# Patient Record
Sex: FEMALE | URBAN - METROPOLITAN AREA
[De-identification: names, ages, dates, MRNs, and addresses within clinical notes are randomized per-mention and may not be internally consistent; named-entity substitution may affect disease eponyms.]

---

## 2017-05-08 ENCOUNTER — INPATIENT (INPATIENT)
Facility: HOSPITAL | Age: 67
LOS: 9 days | Discharge: HOME | End: 2017-05-18
Attending: SURGERY | Admitting: SURGERY
Payer: MEDICARE

## 2017-05-08 VITALS
RESPIRATION RATE: 15 BRPM | SYSTOLIC BLOOD PRESSURE: 129 MMHG | HEART RATE: 99 BPM | DIASTOLIC BLOOD PRESSURE: 94 MMHG | TEMPERATURE: 98 F | OXYGEN SATURATION: 100 %

## 2017-05-08 PROBLEM — Z00.00 ENCOUNTER FOR PREVENTIVE HEALTH EXAMINATION: Status: ACTIVE | Noted: 2017-05-08

## 2017-05-08 LAB
ALBUMIN SERPL ELPH-MCNC: 4 G/DL — SIGNIFICANT CHANGE UP (ref 3.3–5)
ALP SERPL-CCNC: 78 U/L — SIGNIFICANT CHANGE UP (ref 40–120)
ALT FLD-CCNC: 12 U/L — SIGNIFICANT CHANGE UP (ref 4–33)
APTT BLD: 27.7 SEC — SIGNIFICANT CHANGE UP (ref 27.5–37.4)
AST SERPL-CCNC: 14 U/L — SIGNIFICANT CHANGE UP (ref 4–32)
BASE EXCESS BLDV CALC-SCNC: 1.6 MMOL/L — SIGNIFICANT CHANGE UP
BASOPHILS # BLD AUTO: 0.03 K/UL — SIGNIFICANT CHANGE UP (ref 0–0.2)
BASOPHILS NFR BLD AUTO: 0.2 % — SIGNIFICANT CHANGE UP (ref 0–2)
BILIRUB SERPL-MCNC: 0.3 MG/DL — SIGNIFICANT CHANGE UP (ref 0.2–1.2)
BLD GP AB SCN SERPL QL: NEGATIVE — SIGNIFICANT CHANGE UP
BLOOD GAS VENOUS - CREATININE: 0.73 MG/DL — SIGNIFICANT CHANGE UP (ref 0.5–1.3)
BUN SERPL-MCNC: 19 MG/DL — SIGNIFICANT CHANGE UP (ref 7–23)
CALCIUM SERPL-MCNC: 9.7 MG/DL — SIGNIFICANT CHANGE UP (ref 8.4–10.5)
CHLORIDE BLDV-SCNC: 107 MMOL/L — SIGNIFICANT CHANGE UP (ref 96–108)
CHLORIDE SERPL-SCNC: 104 MMOL/L — SIGNIFICANT CHANGE UP (ref 98–107)
CO2 SERPL-SCNC: 24 MMOL/L — SIGNIFICANT CHANGE UP (ref 22–31)
CREAT SERPL-MCNC: 0.79 MG/DL — SIGNIFICANT CHANGE UP (ref 0.5–1.3)
EOSINOPHIL # BLD AUTO: 0 K/UL — SIGNIFICANT CHANGE UP (ref 0–0.5)
EOSINOPHIL NFR BLD AUTO: 0 % — SIGNIFICANT CHANGE UP (ref 0–6)
GAS PNL BLDV: 143 MMOL/L — SIGNIFICANT CHANGE UP (ref 136–146)
GLUCOSE BLDV-MCNC: 116 — HIGH (ref 70–99)
GLUCOSE SERPL-MCNC: 119 MG/DL — HIGH (ref 70–99)
HCO3 BLDV-SCNC: 24 MMOL/L — SIGNIFICANT CHANGE UP (ref 20–27)
HCT VFR BLD CALC: 32.2 % — LOW (ref 34.5–45)
HCT VFR BLDV CALC: 33.9 % — LOW (ref 34.5–45)
HGB BLD-MCNC: 10.8 G/DL — LOW (ref 11.5–15.5)
HGB BLDV-MCNC: 11 G/DL — LOW (ref 11.5–15.5)
IMM GRANULOCYTES NFR BLD AUTO: 0.5 % — SIGNIFICANT CHANGE UP (ref 0–1.5)
INR BLD: 1.02 — SIGNIFICANT CHANGE UP (ref 0.88–1.17)
LACTATE BLDV-MCNC: 1.2 MMOL/L — SIGNIFICANT CHANGE UP (ref 0.5–2)
LYMPHOCYTES # BLD AUTO: 1.77 K/UL — SIGNIFICANT CHANGE UP (ref 1–3.3)
LYMPHOCYTES # BLD AUTO: 11.8 % — LOW (ref 13–44)
MAGNESIUM SERPL-MCNC: 1.7 MG/DL — SIGNIFICANT CHANGE UP (ref 1.6–2.6)
MCHC RBC-ENTMCNC: 30.7 PG — SIGNIFICANT CHANGE UP (ref 27–34)
MCHC RBC-ENTMCNC: 33.5 % — SIGNIFICANT CHANGE UP (ref 32–36)
MCV RBC AUTO: 91.5 FL — SIGNIFICANT CHANGE UP (ref 80–100)
MONOCYTES # BLD AUTO: 1.03 K/UL — HIGH (ref 0–0.9)
MONOCYTES NFR BLD AUTO: 6.9 % — SIGNIFICANT CHANGE UP (ref 2–14)
NEUTROPHILS # BLD AUTO: 12.12 K/UL — HIGH (ref 1.8–7.4)
NEUTROPHILS NFR BLD AUTO: 80.6 % — HIGH (ref 43–77)
OB PNL STL: POSITIVE — SIGNIFICANT CHANGE UP
PCO2 BLDV: 47 MMHG — SIGNIFICANT CHANGE UP (ref 41–51)
PH BLDV: 7.37 PH — SIGNIFICANT CHANGE UP (ref 7.32–7.43)
PLATELET # BLD AUTO: 214 K/UL — SIGNIFICANT CHANGE UP (ref 150–400)
PMV BLD: 10.5 FL — SIGNIFICANT CHANGE UP (ref 7–13)
PO2 BLDV: < 24 MMHG — LOW (ref 35–40)
POTASSIUM BLDV-SCNC: 4.2 MMOL/L — SIGNIFICANT CHANGE UP (ref 3.4–4.5)
POTASSIUM SERPL-MCNC: 4.3 MMOL/L — SIGNIFICANT CHANGE UP (ref 3.5–5.3)
POTASSIUM SERPL-SCNC: 4.3 MMOL/L — SIGNIFICANT CHANGE UP (ref 3.5–5.3)
PROT SERPL-MCNC: 6.9 G/DL — SIGNIFICANT CHANGE UP (ref 6–8.3)
PROTHROM AB SERPL-ACNC: 11.4 SEC — SIGNIFICANT CHANGE UP (ref 9.8–13.1)
RBC # BLD: 3.52 M/UL — LOW (ref 3.8–5.2)
RBC # FLD: 14.6 % — HIGH (ref 10.3–14.5)
RH IG SCN BLD-IMP: NEGATIVE — SIGNIFICANT CHANGE UP
SAO2 % BLDV: 29.3 % — LOW (ref 60–85)
SODIUM SERPL-SCNC: 142 MMOL/L — SIGNIFICANT CHANGE UP (ref 135–145)
WBC # BLD: 15.02 K/UL — HIGH (ref 3.8–10.5)
WBC # FLD AUTO: 15.02 K/UL — HIGH (ref 3.8–10.5)

## 2017-05-08 RX ORDER — ONDANSETRON 8 MG/1
4 TABLET, FILM COATED ORAL ONCE
Qty: 0 | Refills: 0 | Status: COMPLETED | OUTPATIENT
Start: 2017-05-08 | End: 2017-05-08

## 2017-05-08 RX ORDER — SODIUM CHLORIDE 9 MG/ML
1000 INJECTION INTRAMUSCULAR; INTRAVENOUS; SUBCUTANEOUS ONCE
Qty: 0 | Refills: 0 | Status: COMPLETED | OUTPATIENT
Start: 2017-05-08 | End: 2017-05-08

## 2017-05-08 RX ADMIN — SODIUM CHLORIDE 1000 MILLILITER(S): 9 INJECTION INTRAMUSCULAR; INTRAVENOUS; SUBCUTANEOUS at 21:41

## 2017-05-08 NOTE — ED ADULT TRIAGE NOTE - CHIEF COMPLAINT QUOTE
Pt c/o rectal bleeding since last night.  Pt denies any use of blood thinners.  Endorses abdominal pain/N/V.  Pt states was sent in PMD for CT scan. Pt appears pale.  PMHx:  IBS, GERD, diverticulitis, colon resection

## 2017-05-08 NOTE — ED PROVIDER NOTE - MEDICAL DECISION MAKING DETAILS
67 yo F w/ hx of IBS, GERD, diverticulitis, s/p colon resection c/o bright red blood per rectum with RLQ pain since last night. Pt has had about 5 bloody BMs. Pt also endorses weakness, syncopal episode today (no head injury), nausea, non-bloody vomiting. Pt denies use of blood thinners. Pt states was sent in PMD for CT scan.  - Labs, IVF, zofran, Guaiac, CT

## 2017-05-08 NOTE — ED PROVIDER NOTE - OBJECTIVE STATEMENT
65 yo F w/ hx of IBS, GERD, diverticulitis, s/p colon resection c/o bright red blood per rectum with RLQ pain since last night. Pt has had about 5 bloody BMs. Pt also endorses weakness, syncopal episode today (no head injury), nausea, non-bloody vomiting. Pt denies use of blood thinners. Pt states was sent in PMD for CT scan.

## 2017-05-08 NOTE — ED PROVIDER NOTE - ATTENDING CONTRIBUTION TO CARE
Locurto  pt with h/o diverticulitis and partial colectomy  with 1 day of rectal bleeding  assoc with soft stool and RLQ pain     Went to PMD office where she syncopized while siting  Syncope preceeded by her typical panic attack which she felt was due to anxiety  She has had similar episodes in the past   exam  pt in NAD clear lungs  card RRR S1S2  no murmur  abd nondistended  minimal RLQ tenderness    no guarding or rebound Locurto  pt with h/o diverticulitis and partial colectomy  with 1 day of rectal bleeding  assoc with soft stool and RLQ pain     Went to PMD office where she syncopized while siting  Syncope preceeded by her typical panic attack which she felt was due to anxiety  She has had similar episodes in the past   exam  pt in NAD clear lungs  card RRR S1S2  no murmur  abd nondistended  minimal RLQ tenderness    no guarding or rebound  not orthostatic in in ED

## 2017-05-08 NOTE — ED PROVIDER NOTE - PMH
Age Related Osteoporosis    Diverticulitis    GERD (Gastroesophageal Reflux    IBS (Irritable Bowel Syndrome)

## 2017-05-08 NOTE — ED ADULT NURSE NOTE - OBJECTIVE STATEMENT
Alert and oriented x 4. Pt received to spot 6 complaining of LRQ abd pain 6/10 and bloody stool since last night. Pt states she is also nauseous and has syncopal episode , no head trauma. Pt denies chest pain, shortness of breath or dizziness. IV 20g to left AC. Labs drawn. VSS.  at bedside. Will continue to monitor. RN Facilitator.

## 2017-05-09 DIAGNOSIS — K92.2 GASTROINTESTINAL HEMORRHAGE, UNSPECIFIED: ICD-10-CM

## 2017-05-09 LAB
APTT BLD: 25.8 SEC — LOW (ref 27.5–37.4)
BASE EXCESS BLDA CALC-SCNC: -1.6 MMOL/L — SIGNIFICANT CHANGE UP
BLD GP AB SCN SERPL QL: NEGATIVE — SIGNIFICANT CHANGE UP
BUN SERPL-MCNC: 10 MG/DL — SIGNIFICANT CHANGE UP (ref 7–23)
BUN SERPL-MCNC: 16 MG/DL — SIGNIFICANT CHANGE UP (ref 7–23)
CALCIUM SERPL-MCNC: 7.7 MG/DL — LOW (ref 8.4–10.5)
CALCIUM SERPL-MCNC: 8.5 MG/DL — SIGNIFICANT CHANGE UP (ref 8.4–10.5)
CHLORIDE SERPL-SCNC: 106 MMOL/L — SIGNIFICANT CHANGE UP (ref 98–107)
CHLORIDE SERPL-SCNC: 113 MMOL/L — HIGH (ref 98–107)
CO2 SERPL-SCNC: 20 MMOL/L — LOW (ref 22–31)
CO2 SERPL-SCNC: 22 MMOL/L — SIGNIFICANT CHANGE UP (ref 22–31)
CREAT SERPL-MCNC: 0.56 MG/DL — SIGNIFICANT CHANGE UP (ref 0.5–1.3)
CREAT SERPL-MCNC: 0.63 MG/DL — SIGNIFICANT CHANGE UP (ref 0.5–1.3)
GLUCOSE BLDA-MCNC: 152 MG/DL — HIGH (ref 70–99)
GLUCOSE SERPL-MCNC: 115 MG/DL — HIGH (ref 70–99)
GLUCOSE SERPL-MCNC: 152 MG/DL — HIGH (ref 70–99)
HCO3 BLDA-SCNC: 23 MMOL/L — SIGNIFICANT CHANGE UP (ref 22–26)
HCT VFR BLD CALC: 25 % — LOW (ref 34.5–45)
HCT VFR BLD CALC: 25 % — LOW (ref 34.5–45)
HCT VFR BLD CALC: 32.3 % — LOW (ref 34.5–45)
HCT VFR BLD CALC: 33.3 % — LOW (ref 34.5–45)
HCT VFR BLDA CALC: 28.1 % — LOW (ref 34.5–46.5)
HGB BLD-MCNC: 11.3 G/DL — LOW (ref 11.5–15.5)
HGB BLD-MCNC: 11.5 G/DL — SIGNIFICANT CHANGE UP (ref 11.5–15.5)
HGB BLD-MCNC: 8.5 G/DL — LOW (ref 11.5–15.5)
HGB BLD-MCNC: 8.6 G/DL — LOW (ref 11.5–15.5)
HGB BLDA-MCNC: 9.1 G/DL — LOW (ref 11.5–15.5)
INR BLD: 1.04 — SIGNIFICANT CHANGE UP (ref 0.88–1.17)
LACTATE SERPL-SCNC: 2.5 MMOL/L — HIGH (ref 0.5–2)
MAGNESIUM SERPL-MCNC: 1.6 MG/DL — SIGNIFICANT CHANGE UP (ref 1.6–2.6)
MCHC RBC-ENTMCNC: 30.3 PG — SIGNIFICANT CHANGE UP (ref 27–34)
MCHC RBC-ENTMCNC: 30.6 PG — SIGNIFICANT CHANGE UP (ref 27–34)
MCHC RBC-ENTMCNC: 30.9 PG — SIGNIFICANT CHANGE UP (ref 27–34)
MCHC RBC-ENTMCNC: 31.8 PG — SIGNIFICANT CHANGE UP (ref 27–34)
MCHC RBC-ENTMCNC: 33.9 % — SIGNIFICANT CHANGE UP (ref 32–36)
MCHC RBC-ENTMCNC: 34 % — SIGNIFICANT CHANGE UP (ref 32–36)
MCHC RBC-ENTMCNC: 34.4 % — SIGNIFICANT CHANGE UP (ref 32–36)
MCHC RBC-ENTMCNC: 35.6 % — SIGNIFICANT CHANGE UP (ref 32–36)
MCV RBC AUTO: 89 FL — SIGNIFICANT CHANGE UP (ref 80–100)
MCV RBC AUTO: 89.2 FL — SIGNIFICANT CHANGE UP (ref 80–100)
MCV RBC AUTO: 89.3 FL — SIGNIFICANT CHANGE UP (ref 80–100)
MCV RBC AUTO: 90.9 FL — SIGNIFICANT CHANGE UP (ref 80–100)
PCO2 BLDA: 32 MMHG — SIGNIFICANT CHANGE UP (ref 32–48)
PH BLDA: 7.45 PH — SIGNIFICANT CHANGE UP (ref 7.35–7.45)
PHOSPHATE SERPL-MCNC: 2.8 MG/DL — SIGNIFICANT CHANGE UP (ref 2.5–4.5)
PLATELET # BLD AUTO: 139 K/UL — LOW (ref 150–400)
PLATELET # BLD AUTO: 167 K/UL — SIGNIFICANT CHANGE UP (ref 150–400)
PLATELET # BLD AUTO: 174 K/UL — SIGNIFICANT CHANGE UP (ref 150–400)
PLATELET # BLD AUTO: 179 K/UL — SIGNIFICANT CHANGE UP (ref 150–400)
PMV BLD: 10.3 FL — SIGNIFICANT CHANGE UP (ref 7–13)
PMV BLD: 10.3 FL — SIGNIFICANT CHANGE UP (ref 7–13)
PMV BLD: 10.6 FL — SIGNIFICANT CHANGE UP (ref 7–13)
PMV BLD: 11.4 FL — SIGNIFICANT CHANGE UP (ref 7–13)
PO2 BLDA: 350 MMHG — HIGH (ref 83–108)
POTASSIUM BLDA-SCNC: 3.4 MMOL/L — SIGNIFICANT CHANGE UP (ref 3.4–4.5)
POTASSIUM SERPL-MCNC: 3.6 MMOL/L — SIGNIFICANT CHANGE UP (ref 3.5–5.3)
POTASSIUM SERPL-MCNC: 4 MMOL/L — SIGNIFICANT CHANGE UP (ref 3.5–5.3)
POTASSIUM SERPL-SCNC: 3.6 MMOL/L — SIGNIFICANT CHANGE UP (ref 3.5–5.3)
POTASSIUM SERPL-SCNC: 4 MMOL/L — SIGNIFICANT CHANGE UP (ref 3.5–5.3)
PROTHROM AB SERPL-ACNC: 11.7 SEC — SIGNIFICANT CHANGE UP (ref 9.8–13.1)
RBC # BLD: 2.75 M/UL — LOW (ref 3.8–5.2)
RBC # BLD: 2.81 M/UL — LOW (ref 3.8–5.2)
RBC # BLD: 3.62 M/UL — LOW (ref 3.8–5.2)
RBC # BLD: 3.73 M/UL — LOW (ref 3.8–5.2)
RBC # FLD: 14.7 % — HIGH (ref 10.3–14.5)
RBC # FLD: 14.8 % — HIGH (ref 10.3–14.5)
RBC # FLD: 15 % — HIGH (ref 10.3–14.5)
RBC # FLD: 15.2 % — HIGH (ref 10.3–14.5)
RH IG SCN BLD-IMP: NEGATIVE — SIGNIFICANT CHANGE UP
SAO2 % BLDA: 100 % — HIGH (ref 95–99)
SODIUM BLDA-SCNC: 141 MMOL/L — SIGNIFICANT CHANGE UP (ref 136–146)
SODIUM SERPL-SCNC: 141 MMOL/L — SIGNIFICANT CHANGE UP (ref 135–145)
SODIUM SERPL-SCNC: 146 MMOL/L — HIGH (ref 135–145)
WBC # BLD: 10.4 K/UL — SIGNIFICANT CHANGE UP (ref 3.8–10.5)
WBC # BLD: 10.64 K/UL — HIGH (ref 3.8–10.5)
WBC # BLD: 7.91 K/UL — SIGNIFICANT CHANGE UP (ref 3.8–10.5)
WBC # BLD: 9.53 K/UL — SIGNIFICANT CHANGE UP (ref 3.8–10.5)
WBC # FLD AUTO: 10.4 K/UL — SIGNIFICANT CHANGE UP (ref 3.8–10.5)
WBC # FLD AUTO: 10.64 K/UL — HIGH (ref 3.8–10.5)
WBC # FLD AUTO: 7.91 K/UL — SIGNIFICANT CHANGE UP (ref 3.8–10.5)
WBC # FLD AUTO: 9.53 K/UL — SIGNIFICANT CHANGE UP (ref 3.8–10.5)

## 2017-05-09 PROCEDURE — 99223 1ST HOSP IP/OBS HIGH 75: CPT | Mod: 25

## 2017-05-09 PROCEDURE — 74178 CT ABD&PLV WO CNTR FLWD CNTR: CPT | Mod: 26

## 2017-05-09 RX ORDER — SIMVASTATIN 20 MG/1
1 TABLET, FILM COATED ORAL
Qty: 0 | Refills: 0 | COMMUNITY

## 2017-05-09 RX ORDER — MAGNESIUM SULFATE 500 MG/ML
2 VIAL (ML) INJECTION ONCE
Qty: 0 | Refills: 0 | Status: COMPLETED | OUTPATIENT
Start: 2017-05-09 | End: 2017-05-09

## 2017-05-09 RX ORDER — SODIUM CHLORIDE 9 MG/ML
1000 INJECTION, SOLUTION INTRAVENOUS ONCE
Qty: 0 | Refills: 0 | Status: COMPLETED | OUTPATIENT
Start: 2017-05-09 | End: 2017-05-09

## 2017-05-09 RX ORDER — POTASSIUM PHOSPHATE, MONOBASIC POTASSIUM PHOSPHATE, DIBASIC 236; 224 MG/ML; MG/ML
15 INJECTION, SOLUTION INTRAVENOUS ONCE
Qty: 0 | Refills: 0 | Status: COMPLETED | OUTPATIENT
Start: 2017-05-09 | End: 2017-05-09

## 2017-05-09 RX ORDER — ONDANSETRON 8 MG/1
4 TABLET, FILM COATED ORAL ONCE
Qty: 0 | Refills: 0 | Status: COMPLETED | OUTPATIENT
Start: 2017-05-09 | End: 2017-05-09

## 2017-05-09 RX ORDER — ACETAMINOPHEN 500 MG
1000 TABLET ORAL ONCE
Qty: 0 | Refills: 0 | Status: COMPLETED | OUTPATIENT
Start: 2017-05-09 | End: 2017-05-09

## 2017-05-09 RX ORDER — OMEPRAZOLE 10 MG/1
1 CAPSULE, DELAYED RELEASE ORAL
Qty: 0 | Refills: 0 | COMMUNITY

## 2017-05-09 RX ORDER — SODIUM CHLORIDE 9 MG/ML
1000 INJECTION, SOLUTION INTRAVENOUS
Qty: 0 | Refills: 0 | Status: DISCONTINUED | OUTPATIENT
Start: 2017-05-09 | End: 2017-05-12

## 2017-05-09 RX ORDER — PANTOPRAZOLE SODIUM 20 MG/1
40 TABLET, DELAYED RELEASE ORAL DAILY
Qty: 0 | Refills: 0 | Status: DISCONTINUED | OUTPATIENT
Start: 2017-05-09 | End: 2017-05-15

## 2017-05-09 RX ADMIN — Medication 50 GRAM(S): at 13:59

## 2017-05-09 RX ADMIN — SODIUM CHLORIDE 100 MILLILITER(S): 9 INJECTION, SOLUTION INTRAVENOUS at 01:43

## 2017-05-09 RX ADMIN — SODIUM CHLORIDE 2000 MILLILITER(S): 9 INJECTION, SOLUTION INTRAVENOUS at 11:37

## 2017-05-09 RX ADMIN — PANTOPRAZOLE SODIUM 40 MILLIGRAM(S): 20 TABLET, DELAYED RELEASE ORAL at 16:17

## 2017-05-09 RX ADMIN — Medication 400 MILLIGRAM(S): at 11:37

## 2017-05-09 RX ADMIN — SODIUM CHLORIDE 2000 MILLILITER(S): 9 INJECTION, SOLUTION INTRAVENOUS at 18:45

## 2017-05-09 RX ADMIN — SODIUM CHLORIDE 100 MILLILITER(S): 9 INJECTION, SOLUTION INTRAVENOUS at 20:00

## 2017-05-09 RX ADMIN — ONDANSETRON 4 MILLIGRAM(S): 8 TABLET, FILM COATED ORAL at 16:17

## 2017-05-09 RX ADMIN — POTASSIUM PHOSPHATE, MONOBASIC POTASSIUM PHOSPHATE, DIBASIC 62.5 MILLIMOLE(S): 236; 224 INJECTION, SOLUTION INTRAVENOUS at 13:59

## 2017-05-09 RX ADMIN — Medication 1000 MILLIGRAM(S): at 11:52

## 2017-05-09 RX ADMIN — SODIUM CHLORIDE 2000 MILLILITER(S): 9 INJECTION, SOLUTION INTRAVENOUS at 23:20

## 2017-05-09 RX ADMIN — SODIUM CHLORIDE 100 MILLILITER(S): 9 INJECTION, SOLUTION INTRAVENOUS at 13:31

## 2017-05-09 RX ADMIN — ONDANSETRON 4 MILLIGRAM(S): 8 TABLET, FILM COATED ORAL at 07:53

## 2017-05-09 NOTE — H&P ADULT. - HISTORY OF PRESENT ILLNESS
66 F presents with bright red blood per rectum. Patient noticed bright red blood with her bowel movements over the last 1 day. 66 F presents with bright red blood per rectum. Patient noticed bright red blood with her bowel movements over the last 1 day. Patient underwent a laparoscopic assisted sigmoid colectomy for diverticulitis with Dr. Davis in 2009. Supposedly, had one episode of R-sided diverticulitis after that surgery. Her was last colonoscopy was about 3 months ago with her gastroenterologist, Dr. Joseph. However, according to the patient, the colonoscope was unable to transverse to the R side. Patient has had no bloody bowel movements in the ED.

## 2017-05-09 NOTE — H&P ADULT. - ASSESSMENT
66 F with history of sigmoid colectomy for diverticulitis presents with BRBPR. Will admit to [facility] ([facility]).  -NPO  -IVF  -Serial Hcts, transfuse as needed  -Mechanical VTE prophylaxis  -Will need to consult GI for possible colonoscopy  -D/w attending  BERNICE Soto

## 2017-05-10 LAB
APTT BLD: 24.8 SEC — LOW (ref 27.5–37.4)
APTT BLD: 25.5 SEC — LOW (ref 27.5–37.4)
BASE EXCESS BLDV CALC-SCNC: 1.7 MMOL/L — SIGNIFICANT CHANGE UP
BLOOD GAS VENOUS - CREATININE: 0.59 MG/DL — SIGNIFICANT CHANGE UP (ref 0.5–1.3)
BUN SERPL-MCNC: 8 MG/DL — SIGNIFICANT CHANGE UP (ref 7–23)
BUN SERPL-MCNC: 8 MG/DL — SIGNIFICANT CHANGE UP (ref 7–23)
CA-I BLD-SCNC: 1.1 MMOL/L — SIGNIFICANT CHANGE UP (ref 1.03–1.23)
CALCIUM SERPL-MCNC: 8.2 MG/DL — LOW (ref 8.4–10.5)
CALCIUM SERPL-MCNC: 8.4 MG/DL — SIGNIFICANT CHANGE UP (ref 8.4–10.5)
CHLORIDE BLDV-SCNC: 112 MMOL/L — HIGH (ref 96–108)
CHLORIDE SERPL-SCNC: 110 MMOL/L — HIGH (ref 98–107)
CHLORIDE SERPL-SCNC: 113 MMOL/L — HIGH (ref 98–107)
CO2 SERPL-SCNC: 20 MMOL/L — LOW (ref 22–31)
CO2 SERPL-SCNC: 22 MMOL/L — SIGNIFICANT CHANGE UP (ref 22–31)
CREAT SERPL-MCNC: 0.54 MG/DL — SIGNIFICANT CHANGE UP (ref 0.5–1.3)
CREAT SERPL-MCNC: 0.54 MG/DL — SIGNIFICANT CHANGE UP (ref 0.5–1.3)
FIBRINOGEN PPP-MCNC: 326.5 MG/DL — SIGNIFICANT CHANGE UP (ref 310–510)
GAS PNL BLDV: 143 MMOL/L — SIGNIFICANT CHANGE UP (ref 136–146)
GLUCOSE BLDV-MCNC: 85 — SIGNIFICANT CHANGE UP (ref 70–99)
GLUCOSE SERPL-MCNC: 112 MG/DL — HIGH (ref 70–99)
GLUCOSE SERPL-MCNC: 89 MG/DL — SIGNIFICANT CHANGE UP (ref 70–99)
HCO3 BLDV-SCNC: 25 MMOL/L — SIGNIFICANT CHANGE UP (ref 20–27)
HCT VFR BLD CALC: 23.6 % — LOW (ref 34.5–45)
HCT VFR BLD CALC: 28.2 % — LOW (ref 34.5–45)
HCT VFR BLD CALC: 29.8 % — LOW (ref 34.5–45)
HCT VFR BLD CALC: 29.8 % — LOW (ref 34.5–45)
HCT VFR BLD CALC: 29.9 % — LOW (ref 34.5–45)
HCT VFR BLD CALC: 30.6 % — LOW (ref 34.5–45)
HCT VFR BLD CALC: 31 % — LOW (ref 34.5–45)
HCT VFR BLDV CALC: 32.5 % — LOW (ref 34.5–45)
HGB BLD-MCNC: 10.3 G/DL — LOW (ref 11.5–15.5)
HGB BLD-MCNC: 10.3 G/DL — LOW (ref 11.5–15.5)
HGB BLD-MCNC: 10.5 G/DL — LOW (ref 11.5–15.5)
HGB BLD-MCNC: 10.6 G/DL — LOW (ref 11.5–15.5)
HGB BLD-MCNC: 10.9 G/DL — LOW (ref 11.5–15.5)
HGB BLD-MCNC: 8.3 G/DL — LOW (ref 11.5–15.5)
HGB BLD-MCNC: 9.5 G/DL — LOW (ref 11.5–15.5)
HGB BLDV-MCNC: 10.5 G/DL — LOW (ref 11.5–15.5)
INR BLD: 1.01 — SIGNIFICANT CHANGE UP (ref 0.88–1.17)
INR BLD: 1.08 — SIGNIFICANT CHANGE UP (ref 0.88–1.17)
LACTATE BLDV-MCNC: 0.8 MMOL/L — SIGNIFICANT CHANGE UP (ref 0.5–2)
MAGNESIUM SERPL-MCNC: 1.6 MG/DL — SIGNIFICANT CHANGE UP (ref 1.6–2.6)
MAGNESIUM SERPL-MCNC: 1.9 MG/DL — SIGNIFICANT CHANGE UP (ref 1.6–2.6)
MCHC RBC-ENTMCNC: 28 PG — SIGNIFICANT CHANGE UP (ref 27–34)
MCHC RBC-ENTMCNC: 28.9 PG — SIGNIFICANT CHANGE UP (ref 27–34)
MCHC RBC-ENTMCNC: 29.3 PG — SIGNIFICANT CHANGE UP (ref 27–34)
MCHC RBC-ENTMCNC: 30.4 PG — SIGNIFICANT CHANGE UP (ref 27–34)
MCHC RBC-ENTMCNC: 30.5 PG — SIGNIFICANT CHANGE UP (ref 27–34)
MCHC RBC-ENTMCNC: 30.9 PG — SIGNIFICANT CHANGE UP (ref 27–34)
MCHC RBC-ENTMCNC: 33.7 % — SIGNIFICANT CHANGE UP (ref 32–36)
MCHC RBC-ENTMCNC: 34.2 % — SIGNIFICANT CHANGE UP (ref 32–36)
MCHC RBC-ENTMCNC: 34.6 % — SIGNIFICANT CHANGE UP (ref 32–36)
MCHC RBC-ENTMCNC: 35.1 % — SIGNIFICANT CHANGE UP (ref 32–36)
MCHC RBC-ENTMCNC: 35.2 % — SIGNIFICANT CHANGE UP (ref 32–36)
MCHC RBC-ENTMCNC: 35.6 % — SIGNIFICANT CHANGE UP (ref 32–36)
MCV RBC AUTO: 82 FL — SIGNIFICANT CHANGE UP (ref 80–100)
MCV RBC AUTO: 83.7 FL — SIGNIFICANT CHANGE UP (ref 80–100)
MCV RBC AUTO: 86.7 FL — SIGNIFICANT CHANGE UP (ref 80–100)
MCV RBC AUTO: 86.7 FL — SIGNIFICANT CHANGE UP (ref 80–100)
MCV RBC AUTO: 86.8 FL — SIGNIFICANT CHANGE UP (ref 80–100)
MCV RBC AUTO: 87 FL — SIGNIFICANT CHANGE UP (ref 80–100)
PCO2 BLDV: 44 MMHG — SIGNIFICANT CHANGE UP (ref 41–51)
PH BLDV: 7.39 PH — SIGNIFICANT CHANGE UP (ref 7.32–7.43)
PHOSPHATE SERPL-MCNC: 2.9 MG/DL — SIGNIFICANT CHANGE UP (ref 2.5–4.5)
PHOSPHATE SERPL-MCNC: 3.4 MG/DL — SIGNIFICANT CHANGE UP (ref 2.5–4.5)
PLATELET # BLD AUTO: 102 K/UL — LOW (ref 150–400)
PLATELET # BLD AUTO: 135 K/UL — LOW (ref 150–400)
PLATELET # BLD AUTO: 144 K/UL — LOW (ref 150–400)
PLATELET # BLD AUTO: 163 K/UL — SIGNIFICANT CHANGE UP (ref 150–400)
PLATELET # BLD AUTO: 177 K/UL — SIGNIFICANT CHANGE UP (ref 150–400)
PLATELET # BLD AUTO: 201 K/UL — SIGNIFICANT CHANGE UP (ref 150–400)
PMV BLD: 10.3 FL — SIGNIFICANT CHANGE UP (ref 7–13)
PMV BLD: 11.1 FL — SIGNIFICANT CHANGE UP (ref 7–13)
PMV BLD: 9.6 FL — SIGNIFICANT CHANGE UP (ref 7–13)
PMV BLD: 9.7 FL — SIGNIFICANT CHANGE UP (ref 7–13)
PMV BLD: 9.9 FL — SIGNIFICANT CHANGE UP (ref 7–13)
PMV BLD: 9.9 FL — SIGNIFICANT CHANGE UP (ref 7–13)
PO2 BLDV: 29 MMHG — LOW (ref 35–40)
POTASSIUM BLDV-SCNC: 3.5 MMOL/L — SIGNIFICANT CHANGE UP (ref 3.4–4.5)
POTASSIUM SERPL-MCNC: 3.7 MMOL/L — SIGNIFICANT CHANGE UP (ref 3.5–5.3)
POTASSIUM SERPL-MCNC: 3.9 MMOL/L — SIGNIFICANT CHANGE UP (ref 3.5–5.3)
POTASSIUM SERPL-SCNC: 3.7 MMOL/L — SIGNIFICANT CHANGE UP (ref 3.5–5.3)
POTASSIUM SERPL-SCNC: 3.9 MMOL/L — SIGNIFICANT CHANGE UP (ref 3.5–5.3)
PROTHROM AB SERPL-ACNC: 11.3 SEC — SIGNIFICANT CHANGE UP (ref 9.8–13.1)
PROTHROM AB SERPL-ACNC: 12.1 SEC — SIGNIFICANT CHANGE UP (ref 9.8–13.1)
RBC # BLD: 2.72 M/UL — LOW (ref 3.8–5.2)
RBC # BLD: 3.24 M/UL — LOW (ref 3.8–5.2)
RBC # BLD: 3.45 M/UL — LOW (ref 3.8–5.2)
RBC # BLD: 3.53 M/UL — LOW (ref 3.8–5.2)
RBC # BLD: 3.56 M/UL — LOW (ref 3.8–5.2)
RBC # BLD: 3.78 M/UL — LOW (ref 3.8–5.2)
RBC # FLD: 15.6 % — HIGH (ref 10.3–14.5)
RBC # FLD: 16.1 % — HIGH (ref 10.3–14.5)
RBC # FLD: 16.1 % — HIGH (ref 10.3–14.5)
RBC # FLD: 16.3 % — HIGH (ref 10.3–14.5)
RBC # FLD: 17 % — HIGH (ref 10.3–14.5)
RBC # FLD: 17.3 % — HIGH (ref 10.3–14.5)
SAO2 % BLDV: 55 % — LOW (ref 60–85)
SODIUM SERPL-SCNC: 146 MMOL/L — HIGH (ref 135–145)
SODIUM SERPL-SCNC: 149 MMOL/L — HIGH (ref 135–145)
WBC # BLD: 10.94 K/UL — HIGH (ref 3.8–10.5)
WBC # BLD: 12.56 K/UL — HIGH (ref 3.8–10.5)
WBC # BLD: 14.49 K/UL — HIGH (ref 3.8–10.5)
WBC # BLD: 8.92 K/UL — SIGNIFICANT CHANGE UP (ref 3.8–10.5)
WBC # BLD: 9.35 K/UL — SIGNIFICANT CHANGE UP (ref 3.8–10.5)
WBC # BLD: 9.67 K/UL — SIGNIFICANT CHANGE UP (ref 3.8–10.5)
WBC # FLD AUTO: 10.94 K/UL — HIGH (ref 3.8–10.5)
WBC # FLD AUTO: 12.56 K/UL — HIGH (ref 3.8–10.5)
WBC # FLD AUTO: 14.49 K/UL — HIGH (ref 3.8–10.5)
WBC # FLD AUTO: 8.92 K/UL — SIGNIFICANT CHANGE UP (ref 3.8–10.5)
WBC # FLD AUTO: 9.35 K/UL — SIGNIFICANT CHANGE UP (ref 3.8–10.5)
WBC # FLD AUTO: 9.67 K/UL — SIGNIFICANT CHANGE UP (ref 3.8–10.5)

## 2017-05-10 PROCEDURE — 99233 SBSQ HOSP IP/OBS HIGH 50: CPT

## 2017-05-10 RX ORDER — ACETAMINOPHEN 500 MG
1000 TABLET ORAL ONCE
Qty: 0 | Refills: 0 | Status: COMPLETED | OUTPATIENT
Start: 2017-05-10 | End: 2017-05-10

## 2017-05-10 RX ORDER — MAGNESIUM SULFATE 500 MG/ML
2 VIAL (ML) INJECTION ONCE
Qty: 0 | Refills: 0 | Status: COMPLETED | OUTPATIENT
Start: 2017-05-10 | End: 2017-05-10

## 2017-05-10 RX ORDER — POTASSIUM CHLORIDE 20 MEQ
10 PACKET (EA) ORAL
Qty: 0 | Refills: 0 | Status: COMPLETED | OUTPATIENT
Start: 2017-05-10 | End: 2017-05-10

## 2017-05-10 RX ORDER — ONDANSETRON 8 MG/1
4 TABLET, FILM COATED ORAL ONCE
Qty: 0 | Refills: 0 | Status: COMPLETED | OUTPATIENT
Start: 2017-05-10 | End: 2017-05-10

## 2017-05-10 RX ORDER — SODIUM CHLORIDE 9 MG/ML
1000 INJECTION, SOLUTION INTRAVENOUS ONCE
Qty: 0 | Refills: 0 | Status: COMPLETED | OUTPATIENT
Start: 2017-05-10 | End: 2017-05-10

## 2017-05-10 RX ORDER — METOCLOPRAMIDE HCL 10 MG
10 TABLET ORAL EVERY 4 HOURS
Qty: 0 | Refills: 0 | Status: COMPLETED | OUTPATIENT
Start: 2017-05-10 | End: 2017-05-10

## 2017-05-10 RX ORDER — SOD SULF/SODIUM/NAHCO3/KCL/PEG
1 SOLUTION, RECONSTITUTED, ORAL ORAL EVERY 4 HOURS
Qty: 0 | Refills: 0 | Status: COMPLETED | OUTPATIENT
Start: 2017-05-10 | End: 2017-05-10

## 2017-05-10 RX ORDER — SODIUM CHLORIDE 9 MG/ML
1000 INJECTION INTRAMUSCULAR; INTRAVENOUS; SUBCUTANEOUS ONCE
Qty: 0 | Refills: 0 | Status: COMPLETED | OUTPATIENT
Start: 2017-05-10 | End: 2017-05-10

## 2017-05-10 RX ADMIN — Medication 400 MILLIGRAM(S): at 10:45

## 2017-05-10 RX ADMIN — Medication 10 MILLIGRAM(S): at 17:10

## 2017-05-10 RX ADMIN — Medication 1 LITER(S): at 17:54

## 2017-05-10 RX ADMIN — Medication 400 MILLIGRAM(S): at 21:50

## 2017-05-10 RX ADMIN — Medication 400 MILLIGRAM(S): at 01:00

## 2017-05-10 RX ADMIN — SODIUM CHLORIDE 100 MILLILITER(S): 9 INJECTION, SOLUTION INTRAVENOUS at 17:09

## 2017-05-10 RX ADMIN — PANTOPRAZOLE SODIUM 40 MILLIGRAM(S): 20 TABLET, DELAYED RELEASE ORAL at 11:46

## 2017-05-10 RX ADMIN — SODIUM CHLORIDE 100 MILLILITER(S): 9 INJECTION, SOLUTION INTRAVENOUS at 21:14

## 2017-05-10 RX ADMIN — Medication 100 MILLIEQUIVALENT(S): at 07:30

## 2017-05-10 RX ADMIN — SODIUM CHLORIDE 1000 MILLILITER(S): 9 INJECTION INTRAMUSCULAR; INTRAVENOUS; SUBCUTANEOUS at 21:50

## 2017-05-10 RX ADMIN — SODIUM CHLORIDE 100 MILLILITER(S): 9 INJECTION, SOLUTION INTRAVENOUS at 08:22

## 2017-05-10 RX ADMIN — Medication 1000 MILLIGRAM(S): at 01:15

## 2017-05-10 RX ADMIN — Medication 1000 MILLIGRAM(S): at 22:05

## 2017-05-10 RX ADMIN — SODIUM CHLORIDE 6000 MILLILITER(S): 9 INJECTION, SOLUTION INTRAVENOUS at 14:12

## 2017-05-10 RX ADMIN — SODIUM CHLORIDE 100 MILLILITER(S): 9 INJECTION, SOLUTION INTRAVENOUS at 14:12

## 2017-05-10 RX ADMIN — Medication 10 MILLIGRAM(S): at 17:55

## 2017-05-10 RX ADMIN — ONDANSETRON 4 MILLIGRAM(S): 8 TABLET, FILM COATED ORAL at 01:00

## 2017-05-10 RX ADMIN — Medication 50 GRAM(S): at 23:43

## 2017-05-10 RX ADMIN — Medication 50 GRAM(S): at 22:50

## 2017-05-10 RX ADMIN — Medication 100 MILLIEQUIVALENT(S): at 08:33

## 2017-05-10 RX ADMIN — Medication 1000 MILLIGRAM(S): at 11:00

## 2017-05-10 RX ADMIN — Medication 100 MILLIEQUIVALENT(S): at 06:28

## 2017-05-11 LAB
APTT BLD: 26.2 SEC — LOW (ref 27.5–37.4)
BLD GP AB SCN SERPL QL: NEGATIVE — SIGNIFICANT CHANGE UP
BUN SERPL-MCNC: 7 MG/DL — SIGNIFICANT CHANGE UP (ref 7–23)
CALCIUM SERPL-MCNC: 8.2 MG/DL — LOW (ref 8.4–10.5)
CHLORIDE SERPL-SCNC: 107 MMOL/L — SIGNIFICANT CHANGE UP (ref 98–107)
CO2 SERPL-SCNC: 22 MMOL/L — SIGNIFICANT CHANGE UP (ref 22–31)
CREAT SERPL-MCNC: 0.52 MG/DL — SIGNIFICANT CHANGE UP (ref 0.5–1.3)
FIBRINOGEN PPP-MCNC: 351 MG/DL — SIGNIFICANT CHANGE UP (ref 310–510)
GLUCOSE SERPL-MCNC: 92 MG/DL — SIGNIFICANT CHANGE UP (ref 70–99)
HCT VFR BLD CALC: 23.9 % — LOW (ref 34.5–45)
HCT VFR BLD CALC: 24.6 % — LOW (ref 34.5–45)
HCT VFR BLD CALC: 24.7 % — LOW (ref 34.5–45)
HCT VFR BLD CALC: 25.6 % — LOW (ref 34.5–45)
HCT VFR BLD CALC: 29.3 % — LOW (ref 34.5–45)
HCT VFR BLD CALC: 29.4 % — LOW (ref 34.5–45)
HGB BLD-MCNC: 10.5 G/DL — LOW (ref 11.5–15.5)
HGB BLD-MCNC: 10.5 G/DL — LOW (ref 11.5–15.5)
HGB BLD-MCNC: 8.4 G/DL — LOW (ref 11.5–15.5)
HGB BLD-MCNC: 8.6 G/DL — LOW (ref 11.5–15.5)
HGB BLD-MCNC: 8.6 G/DL — LOW (ref 11.5–15.5)
HGB BLD-MCNC: 8.9 G/DL — LOW (ref 11.5–15.5)
INR BLD: 1.02 — SIGNIFICANT CHANGE UP (ref 0.88–1.17)
MAGNESIUM SERPL-MCNC: 2.6 MG/DL — SIGNIFICANT CHANGE UP (ref 1.6–2.6)
MCHC RBC-ENTMCNC: 28.9 PG — SIGNIFICANT CHANGE UP (ref 27–34)
MCHC RBC-ENTMCNC: 28.9 PG — SIGNIFICANT CHANGE UP (ref 27–34)
MCHC RBC-ENTMCNC: 29 PG — SIGNIFICANT CHANGE UP (ref 27–34)
MCHC RBC-ENTMCNC: 29.2 PG — SIGNIFICANT CHANGE UP (ref 27–34)
MCHC RBC-ENTMCNC: 29.2 PG — SIGNIFICANT CHANGE UP (ref 27–34)
MCHC RBC-ENTMCNC: 29.3 PG — SIGNIFICANT CHANGE UP (ref 27–34)
MCHC RBC-ENTMCNC: 34.8 % — SIGNIFICANT CHANGE UP (ref 32–36)
MCHC RBC-ENTMCNC: 34.8 % — SIGNIFICANT CHANGE UP (ref 32–36)
MCHC RBC-ENTMCNC: 35 % — SIGNIFICANT CHANGE UP (ref 32–36)
MCHC RBC-ENTMCNC: 35.1 % — SIGNIFICANT CHANGE UP (ref 32–36)
MCHC RBC-ENTMCNC: 35.7 % — SIGNIFICANT CHANGE UP (ref 32–36)
MCHC RBC-ENTMCNC: 35.8 % — SIGNIFICANT CHANGE UP (ref 32–36)
MCV RBC AUTO: 81.6 FL — SIGNIFICANT CHANGE UP (ref 80–100)
MCV RBC AUTO: 81.7 FL — SIGNIFICANT CHANGE UP (ref 80–100)
MCV RBC AUTO: 82.8 FL — SIGNIFICANT CHANGE UP (ref 80–100)
MCV RBC AUTO: 82.9 FL — SIGNIFICANT CHANGE UP (ref 80–100)
MCV RBC AUTO: 83.1 FL — SIGNIFICANT CHANGE UP (ref 80–100)
MCV RBC AUTO: 83.3 FL — SIGNIFICANT CHANGE UP (ref 80–100)
PHOSPHATE SERPL-MCNC: 3.4 MG/DL — SIGNIFICANT CHANGE UP (ref 2.5–4.5)
PLATELET # BLD AUTO: 105 K/UL — LOW (ref 150–400)
PLATELET # BLD AUTO: 107 K/UL — LOW (ref 150–400)
PLATELET # BLD AUTO: 108 K/UL — LOW (ref 150–400)
PLATELET # BLD AUTO: 94 K/UL — LOW (ref 150–400)
PLATELET # BLD AUTO: 97 K/UL — LOW (ref 150–400)
PLATELET # BLD AUTO: 98 K/UL — LOW (ref 150–400)
PMV BLD: 10.1 FL — SIGNIFICANT CHANGE UP (ref 7–13)
PMV BLD: 10.3 FL — SIGNIFICANT CHANGE UP (ref 7–13)
PMV BLD: 10.4 FL — SIGNIFICANT CHANGE UP (ref 7–13)
PMV BLD: 10.5 FL — SIGNIFICANT CHANGE UP (ref 7–13)
PMV BLD: 9.9 FL — SIGNIFICANT CHANGE UP (ref 7–13)
PMV BLD: 9.9 FL — SIGNIFICANT CHANGE UP (ref 7–13)
POTASSIUM SERPL-MCNC: 3.6 MMOL/L — SIGNIFICANT CHANGE UP (ref 3.5–5.3)
POTASSIUM SERPL-SCNC: 3.6 MMOL/L — SIGNIFICANT CHANGE UP (ref 3.5–5.3)
PROTHROM AB SERPL-ACNC: 11.4 SEC — SIGNIFICANT CHANGE UP (ref 9.8–13.1)
RBC # BLD: 2.87 M/UL — LOW (ref 3.8–5.2)
RBC # BLD: 2.97 M/UL — LOW (ref 3.8–5.2)
RBC # BLD: 2.98 M/UL — LOW (ref 3.8–5.2)
RBC # BLD: 3.08 M/UL — LOW (ref 3.8–5.2)
RBC # BLD: 3.59 M/UL — LOW (ref 3.8–5.2)
RBC # BLD: 3.6 M/UL — LOW (ref 3.8–5.2)
RBC # FLD: 16.9 % — HIGH (ref 10.3–14.5)
RBC # FLD: 17 % — HIGH (ref 10.3–14.5)
RBC # FLD: 17.4 % — HIGH (ref 10.3–14.5)
RBC # FLD: 17.4 % — HIGH (ref 10.3–14.5)
RBC # FLD: 17.5 % — HIGH (ref 10.3–14.5)
RBC # FLD: 17.6 % — HIGH (ref 10.3–14.5)
RH IG SCN BLD-IMP: NEGATIVE — SIGNIFICANT CHANGE UP
SODIUM SERPL-SCNC: 144 MMOL/L — SIGNIFICANT CHANGE UP (ref 135–145)
WBC # BLD: 10.24 K/UL — SIGNIFICANT CHANGE UP (ref 3.8–10.5)
WBC # BLD: 11.08 K/UL — HIGH (ref 3.8–10.5)
WBC # BLD: 11.11 K/UL — HIGH (ref 3.8–10.5)
WBC # BLD: 12.53 K/UL — HIGH (ref 3.8–10.5)
WBC # BLD: 9.2 K/UL — SIGNIFICANT CHANGE UP (ref 3.8–10.5)
WBC # BLD: 9.84 K/UL — SIGNIFICANT CHANGE UP (ref 3.8–10.5)
WBC # FLD AUTO: 10.24 K/UL — SIGNIFICANT CHANGE UP (ref 3.8–10.5)
WBC # FLD AUTO: 11.08 K/UL — HIGH (ref 3.8–10.5)
WBC # FLD AUTO: 11.11 K/UL — HIGH (ref 3.8–10.5)
WBC # FLD AUTO: 12.53 K/UL — HIGH (ref 3.8–10.5)
WBC # FLD AUTO: 9.2 K/UL — SIGNIFICANT CHANGE UP (ref 3.8–10.5)
WBC # FLD AUTO: 9.84 K/UL — SIGNIFICANT CHANGE UP (ref 3.8–10.5)

## 2017-05-11 PROCEDURE — 99292 CRITICAL CARE ADDL 30 MIN: CPT

## 2017-05-11 PROCEDURE — 99291 CRITICAL CARE FIRST HOUR: CPT

## 2017-05-11 PROCEDURE — 78278 ACUTE GI BLOOD LOSS IMAGING: CPT | Mod: 26

## 2017-05-11 PROCEDURE — 74000: CPT | Mod: 26

## 2017-05-11 RX ORDER — ACETAMINOPHEN 500 MG
1000 TABLET ORAL ONCE
Qty: 0 | Refills: 0 | Status: COMPLETED | OUTPATIENT
Start: 2017-05-11 | End: 2017-05-11

## 2017-05-11 RX ORDER — POTASSIUM CHLORIDE 20 MEQ
10 PACKET (EA) ORAL
Qty: 0 | Refills: 0 | Status: COMPLETED | OUTPATIENT
Start: 2017-05-11 | End: 2017-05-11

## 2017-05-11 RX ORDER — MULTIVIT WITH MIN/MFOLATE/K2 340-15/3 G
296 POWDER (GRAM) ORAL
Qty: 0 | Refills: 0 | Status: COMPLETED | OUTPATIENT
Start: 2017-05-11 | End: 2017-05-12

## 2017-05-11 RX ADMIN — Medication 400 MILLIGRAM(S): at 05:30

## 2017-05-11 RX ADMIN — Medication 1000 MILLIGRAM(S): at 05:45

## 2017-05-11 RX ADMIN — SODIUM CHLORIDE 100 MILLILITER(S): 9 INJECTION, SOLUTION INTRAVENOUS at 05:06

## 2017-05-11 RX ADMIN — Medication 100 MILLIEQUIVALENT(S): at 05:06

## 2017-05-11 RX ADMIN — SODIUM CHLORIDE 100 MILLILITER(S): 9 INJECTION, SOLUTION INTRAVENOUS at 08:00

## 2017-05-11 RX ADMIN — PANTOPRAZOLE SODIUM 40 MILLIGRAM(S): 20 TABLET, DELAYED RELEASE ORAL at 11:36

## 2017-05-11 RX ADMIN — Medication 100 MILLIEQUIVALENT(S): at 07:00

## 2017-05-11 RX ADMIN — SODIUM CHLORIDE 100 MILLILITER(S): 9 INJECTION, SOLUTION INTRAVENOUS at 20:27

## 2017-05-11 RX ADMIN — Medication 100 MILLIEQUIVALENT(S): at 06:00

## 2017-05-11 RX ADMIN — Medication 1 DROP(S): at 23:23

## 2017-05-11 RX ADMIN — Medication 296 MILLILITER(S): at 23:23

## 2017-05-11 NOTE — PROVIDER CONTACT NOTE (OTHER) - SITUATION
Patient alerted RN that he felt "slight tingling" on "ball of right foot."  Patient denied numbness.  Denies numbness/tingling on left lower extremity.  Bilateral PT/DP pulses unchanged.

## 2017-05-11 NOTE — PROVIDER CONTACT NOTE (OTHER) - ASSESSMENT
Bilateral lower extremities equally warm, no discoloration, denies numbness.  Denies tingling on left lower extremity.  Complains of tingling on plantar surface of right foot.  Left/right lower extremities equally strong plantar/dorsiflexion, no drift.  Patient states the tingling is "not as bad as yesterday."  Immediately assessed by RAF Ingram.  Previously assessed by MD Holguin at 0530 with no numbness/tingling present.

## 2017-05-12 ENCOUNTER — RESULT REVIEW (OUTPATIENT)
Age: 67
End: 2017-05-12

## 2017-05-12 LAB
APTT BLD: 25.4 SEC — LOW (ref 27.5–37.4)
BLD GP AB SCN SERPL QL: NEGATIVE — SIGNIFICANT CHANGE UP
BUN SERPL-MCNC: 8 MG/DL — SIGNIFICANT CHANGE UP (ref 7–23)
CALCIUM SERPL-MCNC: 8.2 MG/DL — LOW (ref 8.4–10.5)
CHLORIDE SERPL-SCNC: 109 MMOL/L — HIGH (ref 98–107)
CO2 SERPL-SCNC: 18 MMOL/L — LOW (ref 22–31)
CREAT SERPL-MCNC: 0.51 MG/DL — SIGNIFICANT CHANGE UP (ref 0.5–1.3)
GLUCOSE SERPL-MCNC: 77 MG/DL — SIGNIFICANT CHANGE UP (ref 70–99)
HCT VFR BLD CALC: 22.1 % — LOW (ref 34.5–45)
HCT VFR BLD CALC: 22.7 % — LOW (ref 34.5–45)
HCT VFR BLD CALC: 26.3 % — LOW (ref 34.5–45)
HCT VFR BLD CALC: 27 % — LOW (ref 34.5–45)
HGB BLD-MCNC: 7.6 G/DL — LOW (ref 11.5–15.5)
HGB BLD-MCNC: 7.9 G/DL — LOW (ref 11.5–15.5)
HGB BLD-MCNC: 9.3 G/DL — LOW (ref 11.5–15.5)
HGB BLD-MCNC: 9.4 G/DL — LOW (ref 11.5–15.5)
INR BLD: 0.96 — SIGNIFICANT CHANGE UP (ref 0.88–1.17)
MAGNESIUM SERPL-MCNC: 2.1 MG/DL — SIGNIFICANT CHANGE UP (ref 1.6–2.6)
MCHC RBC-ENTMCNC: 29 PG — SIGNIFICANT CHANGE UP (ref 27–34)
MCHC RBC-ENTMCNC: 29.2 PG — SIGNIFICANT CHANGE UP (ref 27–34)
MCHC RBC-ENTMCNC: 29.2 PG — SIGNIFICANT CHANGE UP (ref 27–34)
MCHC RBC-ENTMCNC: 29.3 PG — SIGNIFICANT CHANGE UP (ref 27–34)
MCHC RBC-ENTMCNC: 34.4 % — SIGNIFICANT CHANGE UP (ref 32–36)
MCHC RBC-ENTMCNC: 34.8 % — SIGNIFICANT CHANGE UP (ref 32–36)
MCHC RBC-ENTMCNC: 34.8 % — SIGNIFICANT CHANGE UP (ref 32–36)
MCHC RBC-ENTMCNC: 35.4 % — SIGNIFICANT CHANGE UP (ref 32–36)
MCV RBC AUTO: 82.4 FL — SIGNIFICANT CHANGE UP (ref 80–100)
MCV RBC AUTO: 83.9 FL — SIGNIFICANT CHANGE UP (ref 80–100)
MCV RBC AUTO: 84.1 FL — SIGNIFICANT CHANGE UP (ref 80–100)
MCV RBC AUTO: 84.4 FL — SIGNIFICANT CHANGE UP (ref 80–100)
PHOSPHATE SERPL-MCNC: 2.9 MG/DL — SIGNIFICANT CHANGE UP (ref 2.5–4.5)
PLATELET # BLD AUTO: 102 K/UL — LOW (ref 150–400)
PLATELET # BLD AUTO: 113 K/UL — LOW (ref 150–400)
PLATELET # BLD AUTO: 118 K/UL — LOW (ref 150–400)
PLATELET # BLD AUTO: 91 K/UL — LOW (ref 150–400)
PMV BLD: 10 FL — SIGNIFICANT CHANGE UP (ref 7–13)
PMV BLD: 10.5 FL — SIGNIFICANT CHANGE UP (ref 7–13)
PMV BLD: 10.9 FL — SIGNIFICANT CHANGE UP (ref 7–13)
PMV BLD: 9.9 FL — SIGNIFICANT CHANGE UP (ref 7–13)
POTASSIUM SERPL-MCNC: 4.3 MMOL/L — SIGNIFICANT CHANGE UP (ref 3.5–5.3)
POTASSIUM SERPL-SCNC: 4.3 MMOL/L — SIGNIFICANT CHANGE UP (ref 3.5–5.3)
PROTHROM AB SERPL-ACNC: 10.8 SEC — SIGNIFICANT CHANGE UP (ref 9.8–13.1)
RBC # BLD: 2.62 M/UL — LOW (ref 3.8–5.2)
RBC # BLD: 2.7 M/UL — LOW (ref 3.8–5.2)
RBC # BLD: 3.19 M/UL — LOW (ref 3.8–5.2)
RBC # BLD: 3.22 M/UL — LOW (ref 3.8–5.2)
RBC # FLD: 16.8 % — HIGH (ref 10.3–14.5)
RBC # FLD: 17 % — HIGH (ref 10.3–14.5)
RBC # FLD: 17.8 % — HIGH (ref 10.3–14.5)
RBC # FLD: 17.8 % — HIGH (ref 10.3–14.5)
RH IG SCN BLD-IMP: NEGATIVE — SIGNIFICANT CHANGE UP
SODIUM SERPL-SCNC: 144 MMOL/L — SIGNIFICANT CHANGE UP (ref 135–145)
WBC # BLD: 10.53 K/UL — HIGH (ref 3.8–10.5)
WBC # BLD: 10.62 K/UL — HIGH (ref 3.8–10.5)
WBC # BLD: 9.53 K/UL — SIGNIFICANT CHANGE UP (ref 3.8–10.5)
WBC # BLD: 9.95 K/UL — SIGNIFICANT CHANGE UP (ref 3.8–10.5)
WBC # FLD AUTO: 10.53 K/UL — HIGH (ref 3.8–10.5)
WBC # FLD AUTO: 10.62 K/UL — HIGH (ref 3.8–10.5)
WBC # FLD AUTO: 9.53 K/UL — SIGNIFICANT CHANGE UP (ref 3.8–10.5)
WBC # FLD AUTO: 9.95 K/UL — SIGNIFICANT CHANGE UP (ref 3.8–10.5)

## 2017-05-12 PROCEDURE — 88305 TISSUE EXAM BY PATHOLOGIST: CPT | Mod: 26

## 2017-05-12 PROCEDURE — 99233 SBSQ HOSP IP/OBS HIGH 50: CPT | Mod: GC

## 2017-05-12 PROCEDURE — 71010: CPT | Mod: 26

## 2017-05-12 RX ORDER — HEPARIN SODIUM 5000 [USP'U]/ML
5000 INJECTION INTRAVENOUS; SUBCUTANEOUS EVERY 8 HOURS
Qty: 0 | Refills: 0 | Status: DISCONTINUED | OUTPATIENT
Start: 2017-05-12 | End: 2017-05-18

## 2017-05-12 RX ORDER — ONDANSETRON 8 MG/1
4 TABLET, FILM COATED ORAL ONCE
Qty: 0 | Refills: 0 | Status: DISCONTINUED | OUTPATIENT
Start: 2017-05-12 | End: 2017-05-13

## 2017-05-12 RX ORDER — ONDANSETRON 8 MG/1
4 TABLET, FILM COATED ORAL ONCE
Qty: 0 | Refills: 0 | Status: COMPLETED | OUTPATIENT
Start: 2017-05-12 | End: 2017-05-12

## 2017-05-12 RX ADMIN — PANTOPRAZOLE SODIUM 40 MILLIGRAM(S): 20 TABLET, DELAYED RELEASE ORAL at 13:58

## 2017-05-12 RX ADMIN — Medication 296 MILLILITER(S): at 06:31

## 2017-05-12 RX ADMIN — HEPARIN SODIUM 5000 UNIT(S): 5000 INJECTION INTRAVENOUS; SUBCUTANEOUS at 21:46

## 2017-05-12 RX ADMIN — SODIUM CHLORIDE 100 MILLILITER(S): 9 INJECTION, SOLUTION INTRAVENOUS at 06:31

## 2017-05-12 RX ADMIN — ONDANSETRON 4 MILLIGRAM(S): 8 TABLET, FILM COATED ORAL at 00:41

## 2017-05-12 RX ADMIN — SODIUM CHLORIDE 100 MILLILITER(S): 9 INJECTION, SOLUTION INTRAVENOUS at 08:17

## 2017-05-12 RX ADMIN — SODIUM CHLORIDE 50 MILLILITER(S): 9 INJECTION, SOLUTION INTRAVENOUS at 21:46

## 2017-05-13 LAB
APTT BLD: 26.1 SEC — LOW (ref 27.5–37.4)
BUN SERPL-MCNC: 6 MG/DL — LOW (ref 7–23)
CALCIUM SERPL-MCNC: 7.9 MG/DL — LOW (ref 8.4–10.5)
CHLORIDE SERPL-SCNC: 107 MMOL/L — SIGNIFICANT CHANGE UP (ref 98–107)
CO2 SERPL-SCNC: 25 MMOL/L — SIGNIFICANT CHANGE UP (ref 22–31)
CREAT SERPL-MCNC: 0.5 MG/DL — SIGNIFICANT CHANGE UP (ref 0.5–1.3)
GLUCOSE SERPL-MCNC: 95 MG/DL — SIGNIFICANT CHANGE UP (ref 70–99)
HCT VFR BLD CALC: 22 % — LOW (ref 34.5–45)
HCT VFR BLD CALC: 28.9 % — LOW (ref 34.5–45)
HCT VFR BLD CALC: 31 % — LOW (ref 34.5–45)
HCT VFR BLD CALC: 31.4 % — LOW (ref 34.5–45)
HGB BLD-MCNC: 10.1 G/DL — LOW (ref 11.5–15.5)
HGB BLD-MCNC: 10.9 G/DL — LOW (ref 11.5–15.5)
HGB BLD-MCNC: 10.9 G/DL — LOW (ref 11.5–15.5)
HGB BLD-MCNC: 7.6 G/DL — LOW (ref 11.5–15.5)
MAGNESIUM SERPL-MCNC: 1.8 MG/DL — SIGNIFICANT CHANGE UP (ref 1.6–2.6)
MCHC RBC-ENTMCNC: 28.9 PG — SIGNIFICANT CHANGE UP (ref 27–34)
MCHC RBC-ENTMCNC: 29.3 PG — SIGNIFICANT CHANGE UP (ref 27–34)
MCHC RBC-ENTMCNC: 29.6 PG — SIGNIFICANT CHANGE UP (ref 27–34)
MCHC RBC-ENTMCNC: 29.6 PG — SIGNIFICANT CHANGE UP (ref 27–34)
MCHC RBC-ENTMCNC: 34.5 % — SIGNIFICANT CHANGE UP (ref 32–36)
MCHC RBC-ENTMCNC: 34.7 % — SIGNIFICANT CHANGE UP (ref 32–36)
MCHC RBC-ENTMCNC: 34.9 % — SIGNIFICANT CHANGE UP (ref 32–36)
MCHC RBC-ENTMCNC: 35.2 % — SIGNIFICANT CHANGE UP (ref 32–36)
MCV RBC AUTO: 83.7 FL — SIGNIFICANT CHANGE UP (ref 80–100)
MCV RBC AUTO: 84.2 FL — SIGNIFICANT CHANGE UP (ref 80–100)
MCV RBC AUTO: 84.4 FL — SIGNIFICANT CHANGE UP (ref 80–100)
MCV RBC AUTO: 84.8 FL — SIGNIFICANT CHANGE UP (ref 80–100)
PHOSPHATE SERPL-MCNC: 2.8 MG/DL — SIGNIFICANT CHANGE UP (ref 2.5–4.5)
PLATELET # BLD AUTO: 103 K/UL — LOW (ref 150–400)
PLATELET # BLD AUTO: 103 K/UL — LOW (ref 150–400)
PLATELET # BLD AUTO: 122 K/UL — LOW (ref 150–400)
PLATELET # BLD AUTO: 128 K/UL — LOW (ref 150–400)
PMV BLD: 10 FL — SIGNIFICANT CHANGE UP (ref 7–13)
PMV BLD: 10 FL — SIGNIFICANT CHANGE UP (ref 7–13)
PMV BLD: 9.6 FL — SIGNIFICANT CHANGE UP (ref 7–13)
PMV BLD: 9.9 FL — SIGNIFICANT CHANGE UP (ref 7–13)
POTASSIUM SERPL-MCNC: 3.6 MMOL/L — SIGNIFICANT CHANGE UP (ref 3.5–5.3)
POTASSIUM SERPL-SCNC: 3.6 MMOL/L — SIGNIFICANT CHANGE UP (ref 3.5–5.3)
RBC # BLD: 2.63 M/UL — LOW (ref 3.8–5.2)
RBC # BLD: 3.41 M/UL — LOW (ref 3.8–5.2)
RBC # BLD: 3.68 M/UL — LOW (ref 3.8–5.2)
RBC # BLD: 3.72 M/UL — LOW (ref 3.8–5.2)
RBC # FLD: 16.6 % — HIGH (ref 10.3–14.5)
RBC # FLD: 16.7 % — HIGH (ref 10.3–14.5)
RBC # FLD: 16.7 % — HIGH (ref 10.3–14.5)
RBC # FLD: 17.3 % — HIGH (ref 10.3–14.5)
SODIUM SERPL-SCNC: 143 MMOL/L — SIGNIFICANT CHANGE UP (ref 135–145)
WBC # BLD: 10.38 K/UL — SIGNIFICANT CHANGE UP (ref 3.8–10.5)
WBC # BLD: 11.4 K/UL — HIGH (ref 3.8–10.5)
WBC # BLD: 8.4 K/UL — SIGNIFICANT CHANGE UP (ref 3.8–10.5)
WBC # BLD: 8.96 K/UL — SIGNIFICANT CHANGE UP (ref 3.8–10.5)
WBC # FLD AUTO: 10.38 K/UL — SIGNIFICANT CHANGE UP (ref 3.8–10.5)
WBC # FLD AUTO: 11.4 K/UL — HIGH (ref 3.8–10.5)
WBC # FLD AUTO: 8.4 K/UL — SIGNIFICANT CHANGE UP (ref 3.8–10.5)
WBC # FLD AUTO: 8.96 K/UL — SIGNIFICANT CHANGE UP (ref 3.8–10.5)

## 2017-05-13 PROCEDURE — 99233 SBSQ HOSP IP/OBS HIGH 50: CPT | Mod: GC

## 2017-05-13 PROCEDURE — 74174 CTA ABD&PLVS W/CONTRAST: CPT | Mod: 26

## 2017-05-13 RX ORDER — SODIUM CHLORIDE 9 MG/ML
1000 INJECTION, SOLUTION INTRAVENOUS ONCE
Qty: 0 | Refills: 0 | Status: COMPLETED | OUTPATIENT
Start: 2017-05-13 | End: 2017-05-13

## 2017-05-13 RX ORDER — POTASSIUM CHLORIDE 20 MEQ
20 PACKET (EA) ORAL ONCE
Qty: 0 | Refills: 0 | Status: COMPLETED | OUTPATIENT
Start: 2017-05-13 | End: 2017-05-13

## 2017-05-13 RX ORDER — SODIUM CHLORIDE 9 MG/ML
1000 INJECTION INTRAMUSCULAR; INTRAVENOUS; SUBCUTANEOUS
Qty: 0 | Refills: 0 | Status: DISCONTINUED | OUTPATIENT
Start: 2017-05-13 | End: 2017-05-13

## 2017-05-13 RX ORDER — MAGNESIUM SULFATE 500 MG/ML
2 VIAL (ML) INJECTION ONCE
Qty: 0 | Refills: 0 | Status: COMPLETED | OUTPATIENT
Start: 2017-05-13 | End: 2017-05-13

## 2017-05-13 RX ORDER — POTASSIUM CHLORIDE 20 MEQ
10 PACKET (EA) ORAL
Qty: 0 | Refills: 0 | Status: DISCONTINUED | OUTPATIENT
Start: 2017-05-13 | End: 2017-05-13

## 2017-05-13 RX ORDER — SODIUM CHLORIDE 9 MG/ML
1000 INJECTION, SOLUTION INTRAVENOUS
Qty: 0 | Refills: 0 | Status: DISCONTINUED | OUTPATIENT
Start: 2017-05-13 | End: 2017-05-13

## 2017-05-13 RX ADMIN — Medication 20 MILLIEQUIVALENT(S): at 05:46

## 2017-05-13 RX ADMIN — Medication 100 MILLIEQUIVALENT(S): at 04:37

## 2017-05-13 RX ADMIN — Medication 50 GRAM(S): at 04:42

## 2017-05-13 RX ADMIN — HEPARIN SODIUM 5000 UNIT(S): 5000 INJECTION INTRAVENOUS; SUBCUTANEOUS at 13:51

## 2017-05-13 RX ADMIN — HEPARIN SODIUM 5000 UNIT(S): 5000 INJECTION INTRAVENOUS; SUBCUTANEOUS at 05:46

## 2017-05-13 RX ADMIN — SODIUM CHLORIDE 100 MILLILITER(S): 9 INJECTION, SOLUTION INTRAVENOUS at 12:00

## 2017-05-13 RX ADMIN — SODIUM CHLORIDE 2000 MILLILITER(S): 9 INJECTION, SOLUTION INTRAVENOUS at 01:38

## 2017-05-13 RX ADMIN — HEPARIN SODIUM 5000 UNIT(S): 5000 INJECTION INTRAVENOUS; SUBCUTANEOUS at 21:21

## 2017-05-13 RX ADMIN — SODIUM CHLORIDE 2000 MILLILITER(S): 9 INJECTION, SOLUTION INTRAVENOUS at 02:55

## 2017-05-13 RX ADMIN — PANTOPRAZOLE SODIUM 40 MILLIGRAM(S): 20 TABLET, DELAYED RELEASE ORAL at 11:55

## 2017-05-14 LAB
HBV CORE AB SER-ACNC: NONREACTIVE — SIGNIFICANT CHANGE UP
HBV SURFACE AB SER-ACNC: REACTIVE — SIGNIFICANT CHANGE UP
HBV SURFACE AG SER-ACNC: NONREACTIVE — SIGNIFICANT CHANGE UP
HCT VFR BLD CALC: 28.8 % — LOW (ref 34.5–45)
HCT VFR BLD CALC: 31.6 % — LOW (ref 34.5–45)
HCV AB S/CO SERPL IA: 0.1 S/CO — SIGNIFICANT CHANGE UP
HCV AB SERPL-IMP: SIGNIFICANT CHANGE UP
HGB BLD-MCNC: 10 G/DL — LOW (ref 11.5–15.5)
HGB BLD-MCNC: 10.7 G/DL — LOW (ref 11.5–15.5)
HIV1 AG SER QL: SIGNIFICANT CHANGE UP
HIV1+2 AB SPEC QL: SIGNIFICANT CHANGE UP
MCHC RBC-ENTMCNC: 29.3 PG — SIGNIFICANT CHANGE UP (ref 27–34)
MCHC RBC-ENTMCNC: 29.9 PG — SIGNIFICANT CHANGE UP (ref 27–34)
MCHC RBC-ENTMCNC: 33.9 % — SIGNIFICANT CHANGE UP (ref 32–36)
MCHC RBC-ENTMCNC: 34.7 % — SIGNIFICANT CHANGE UP (ref 32–36)
MCV RBC AUTO: 86 FL — SIGNIFICANT CHANGE UP (ref 80–100)
MCV RBC AUTO: 86.6 FL — SIGNIFICANT CHANGE UP (ref 80–100)
PLATELET # BLD AUTO: 135 K/UL — LOW (ref 150–400)
PLATELET # BLD AUTO: 178 K/UL — SIGNIFICANT CHANGE UP (ref 150–400)
PMV BLD: 10.1 FL — SIGNIFICANT CHANGE UP (ref 7–13)
PMV BLD: 10.1 FL — SIGNIFICANT CHANGE UP (ref 7–13)
RBC # BLD: 3.35 M/UL — LOW (ref 3.8–5.2)
RBC # BLD: 3.65 M/UL — LOW (ref 3.8–5.2)
RBC # FLD: 17.1 % — HIGH (ref 10.3–14.5)
RBC # FLD: 17.4 % — HIGH (ref 10.3–14.5)
WBC # BLD: 14.8 K/UL — HIGH (ref 3.8–10.5)
WBC # BLD: 9.25 K/UL — SIGNIFICANT CHANGE UP (ref 3.8–10.5)
WBC # FLD AUTO: 14.8 K/UL — HIGH (ref 3.8–10.5)
WBC # FLD AUTO: 9.25 K/UL — SIGNIFICANT CHANGE UP (ref 3.8–10.5)

## 2017-05-14 RX ADMIN — PANTOPRAZOLE SODIUM 40 MILLIGRAM(S): 20 TABLET, DELAYED RELEASE ORAL at 14:22

## 2017-05-14 RX ADMIN — HEPARIN SODIUM 5000 UNIT(S): 5000 INJECTION INTRAVENOUS; SUBCUTANEOUS at 22:07

## 2017-05-14 RX ADMIN — HEPARIN SODIUM 5000 UNIT(S): 5000 INJECTION INTRAVENOUS; SUBCUTANEOUS at 14:23

## 2017-05-15 LAB
BUN SERPL-MCNC: 7 MG/DL — SIGNIFICANT CHANGE UP (ref 7–23)
CALCIUM SERPL-MCNC: 8.9 MG/DL — SIGNIFICANT CHANGE UP (ref 8.4–10.5)
CHLORIDE SERPL-SCNC: 106 MMOL/L — SIGNIFICANT CHANGE UP (ref 98–107)
CO2 SERPL-SCNC: 26 MMOL/L — SIGNIFICANT CHANGE UP (ref 22–31)
CREAT SERPL-MCNC: 0.61 MG/DL — SIGNIFICANT CHANGE UP (ref 0.5–1.3)
GLUCOSE SERPL-MCNC: 111 MG/DL — HIGH (ref 70–99)
HCT VFR BLD CALC: 28.3 % — LOW (ref 34.5–45)
HCT VFR BLD CALC: 28.4 % — LOW (ref 34.5–45)
HGB BLD-MCNC: 9.5 G/DL — LOW (ref 11.5–15.5)
HGB BLD-MCNC: 9.6 G/DL — LOW (ref 11.5–15.5)
MAGNESIUM SERPL-MCNC: 1.8 MG/DL — SIGNIFICANT CHANGE UP (ref 1.6–2.6)
MCHC RBC-ENTMCNC: 29.4 PG — SIGNIFICANT CHANGE UP (ref 27–34)
MCHC RBC-ENTMCNC: 29.5 PG — SIGNIFICANT CHANGE UP (ref 27–34)
MCHC RBC-ENTMCNC: 33.6 % — SIGNIFICANT CHANGE UP (ref 32–36)
MCHC RBC-ENTMCNC: 33.8 % — SIGNIFICANT CHANGE UP (ref 32–36)
MCV RBC AUTO: 87.1 FL — SIGNIFICANT CHANGE UP (ref 80–100)
MCV RBC AUTO: 87.9 FL — SIGNIFICANT CHANGE UP (ref 80–100)
PHOSPHATE SERPL-MCNC: 4.1 MG/DL — SIGNIFICANT CHANGE UP (ref 2.5–4.5)
PLATELET # BLD AUTO: 169 K/UL — SIGNIFICANT CHANGE UP (ref 150–400)
PLATELET # BLD AUTO: 187 K/UL — SIGNIFICANT CHANGE UP (ref 150–400)
PMV BLD: 10 FL — SIGNIFICANT CHANGE UP (ref 7–13)
PMV BLD: 9.8 FL — SIGNIFICANT CHANGE UP (ref 7–13)
POTASSIUM SERPL-MCNC: 3.6 MMOL/L — SIGNIFICANT CHANGE UP (ref 3.5–5.3)
POTASSIUM SERPL-SCNC: 3.6 MMOL/L — SIGNIFICANT CHANGE UP (ref 3.5–5.3)
RBC # BLD: 3.22 M/UL — LOW (ref 3.8–5.2)
RBC # BLD: 3.26 M/UL — LOW (ref 3.8–5.2)
RBC # FLD: 17.7 % — HIGH (ref 10.3–14.5)
RBC # FLD: 17.7 % — HIGH (ref 10.3–14.5)
SODIUM SERPL-SCNC: 145 MMOL/L — SIGNIFICANT CHANGE UP (ref 135–145)
SURGICAL PATHOLOGY STUDY: SIGNIFICANT CHANGE UP
WBC # BLD: 6.69 K/UL — SIGNIFICANT CHANGE UP (ref 3.8–10.5)
WBC # BLD: 7.27 K/UL — SIGNIFICANT CHANGE UP (ref 3.8–10.5)
WBC # FLD AUTO: 6.69 K/UL — SIGNIFICANT CHANGE UP (ref 3.8–10.5)
WBC # FLD AUTO: 7.27 K/UL — SIGNIFICANT CHANGE UP (ref 3.8–10.5)

## 2017-05-15 RX ORDER — HYDROCORTISONE 1 %
1 OINTMENT (GRAM) TOPICAL ONCE
Qty: 0 | Refills: 0 | Status: COMPLETED | OUTPATIENT
Start: 2017-05-15 | End: 2017-05-15

## 2017-05-15 RX ORDER — PANTOPRAZOLE SODIUM 20 MG/1
40 TABLET, DELAYED RELEASE ORAL
Qty: 0 | Refills: 0 | Status: DISCONTINUED | OUTPATIENT
Start: 2017-05-15 | End: 2017-05-18

## 2017-05-15 RX ORDER — DIPHENHYDRAMINE HCL 50 MG
25 CAPSULE ORAL EVERY 4 HOURS
Qty: 0 | Refills: 0 | Status: DISCONTINUED | OUTPATIENT
Start: 2017-05-15 | End: 2017-05-18

## 2017-05-15 RX ORDER — POTASSIUM CHLORIDE 20 MEQ
40 PACKET (EA) ORAL ONCE
Qty: 0 | Refills: 0 | Status: COMPLETED | OUTPATIENT
Start: 2017-05-15 | End: 2017-05-15

## 2017-05-15 RX ORDER — MAGNESIUM SULFATE 500 MG/ML
2 VIAL (ML) INJECTION ONCE
Qty: 0 | Refills: 0 | Status: COMPLETED | OUTPATIENT
Start: 2017-05-15 | End: 2017-05-15

## 2017-05-15 RX ADMIN — HEPARIN SODIUM 5000 UNIT(S): 5000 INJECTION INTRAVENOUS; SUBCUTANEOUS at 05:41

## 2017-05-15 RX ADMIN — PANTOPRAZOLE SODIUM 40 MILLIGRAM(S): 20 TABLET, DELAYED RELEASE ORAL at 09:30

## 2017-05-15 RX ADMIN — HEPARIN SODIUM 5000 UNIT(S): 5000 INJECTION INTRAVENOUS; SUBCUTANEOUS at 22:42

## 2017-05-15 RX ADMIN — Medication 50 GRAM(S): at 09:29

## 2017-05-15 RX ADMIN — HEPARIN SODIUM 5000 UNIT(S): 5000 INJECTION INTRAVENOUS; SUBCUTANEOUS at 13:13

## 2017-05-15 RX ADMIN — Medication 40 MILLIEQUIVALENT(S): at 09:29

## 2017-05-15 RX ADMIN — Medication 1 APPLICATION(S): at 09:30

## 2017-05-16 ENCOUNTER — TRANSCRIPTION ENCOUNTER (OUTPATIENT)
Age: 67
End: 2017-05-16

## 2017-05-16 LAB
BLD GP AB SCN SERPL QL: NEGATIVE — SIGNIFICANT CHANGE UP
BUN SERPL-MCNC: 12 MG/DL — SIGNIFICANT CHANGE UP (ref 7–23)
CALCIUM SERPL-MCNC: 8.8 MG/DL — SIGNIFICANT CHANGE UP (ref 8.4–10.5)
CHLORIDE SERPL-SCNC: 110 MMOL/L — HIGH (ref 98–107)
CO2 SERPL-SCNC: 24 MMOL/L — SIGNIFICANT CHANGE UP (ref 22–31)
CREAT SERPL-MCNC: 0.65 MG/DL — SIGNIFICANT CHANGE UP (ref 0.5–1.3)
GLUCOSE SERPL-MCNC: 101 MG/DL — HIGH (ref 70–99)
HCT VFR BLD CALC: 26.5 % — LOW (ref 34.5–45)
HCT VFR BLD CALC: 30.6 % — LOW (ref 34.5–45)
HCV RNA SERPL NAA DL=5-ACNC: NOT DETECTED IU/ML — SIGNIFICANT CHANGE UP
HCV RNA SPEC NAA+PROBE-LOG IU: SIGNIFICANT CHANGE UP LOGIU/ML
HGB BLD-MCNC: 10.1 G/DL — LOW (ref 11.5–15.5)
HGB BLD-MCNC: 8.9 G/DL — LOW (ref 11.5–15.5)
MAGNESIUM SERPL-MCNC: 2 MG/DL — SIGNIFICANT CHANGE UP (ref 1.6–2.6)
MCHC RBC-ENTMCNC: 29.8 PG — SIGNIFICANT CHANGE UP (ref 27–34)
MCHC RBC-ENTMCNC: 29.8 PG — SIGNIFICANT CHANGE UP (ref 27–34)
MCHC RBC-ENTMCNC: 33 % — SIGNIFICANT CHANGE UP (ref 32–36)
MCHC RBC-ENTMCNC: 33.6 % — SIGNIFICANT CHANGE UP (ref 32–36)
MCV RBC AUTO: 88.6 FL — SIGNIFICANT CHANGE UP (ref 80–100)
MCV RBC AUTO: 90.3 FL — SIGNIFICANT CHANGE UP (ref 80–100)
PLATELET # BLD AUTO: 185 K/UL — SIGNIFICANT CHANGE UP (ref 150–400)
PLATELET # BLD AUTO: 215 K/UL — SIGNIFICANT CHANGE UP (ref 150–400)
PMV BLD: 10.3 FL — SIGNIFICANT CHANGE UP (ref 7–13)
PMV BLD: 10.7 FL — SIGNIFICANT CHANGE UP (ref 7–13)
POTASSIUM SERPL-MCNC: 4 MMOL/L — SIGNIFICANT CHANGE UP (ref 3.5–5.3)
POTASSIUM SERPL-SCNC: 4 MMOL/L — SIGNIFICANT CHANGE UP (ref 3.5–5.3)
RBC # BLD: 2.99 M/UL — LOW (ref 3.8–5.2)
RBC # BLD: 3.39 M/UL — LOW (ref 3.8–5.2)
RBC # FLD: 18 % — HIGH (ref 10.3–14.5)
RBC # FLD: 18.3 % — HIGH (ref 10.3–14.5)
RH IG SCN BLD-IMP: NEGATIVE — SIGNIFICANT CHANGE UP
SODIUM SERPL-SCNC: 148 MMOL/L — HIGH (ref 135–145)
WBC # BLD: 6.34 K/UL — SIGNIFICANT CHANGE UP (ref 3.8–10.5)
WBC # BLD: 7.91 K/UL — SIGNIFICANT CHANGE UP (ref 3.8–10.5)
WBC # FLD AUTO: 6.34 K/UL — SIGNIFICANT CHANGE UP (ref 3.8–10.5)
WBC # FLD AUTO: 7.91 K/UL — SIGNIFICANT CHANGE UP (ref 3.8–10.5)

## 2017-05-16 RX ORDER — ONDANSETRON 8 MG/1
4 TABLET, FILM COATED ORAL ONCE
Qty: 0 | Refills: 0 | Status: DISCONTINUED | OUTPATIENT
Start: 2017-05-16 | End: 2017-05-18

## 2017-05-16 RX ORDER — SODIUM CHLORIDE 9 MG/ML
1000 INJECTION, SOLUTION INTRAVENOUS
Qty: 0 | Refills: 0 | Status: DISCONTINUED | OUTPATIENT
Start: 2017-05-16 | End: 2017-05-17

## 2017-05-16 RX ORDER — SOD SULF/SODIUM/NAHCO3/KCL/PEG
1 SOLUTION, RECONSTITUTED, ORAL ORAL EVERY 4 HOURS
Qty: 0 | Refills: 0 | Status: DISCONTINUED | OUTPATIENT
Start: 2017-05-16 | End: 2017-05-16

## 2017-05-16 RX ORDER — MULTIVIT WITH MIN/MFOLATE/K2 340-15/3 G
300 POWDER (GRAM) ORAL EVERY 4 HOURS
Qty: 0 | Refills: 0 | Status: COMPLETED | OUTPATIENT
Start: 2017-05-16 | End: 2017-05-16

## 2017-05-16 RX ORDER — SIMVASTATIN 20 MG/1
20 TABLET, FILM COATED ORAL AT BEDTIME
Qty: 0 | Refills: 0 | Status: DISCONTINUED | OUTPATIENT
Start: 2017-05-16 | End: 2017-05-18

## 2017-05-16 RX ADMIN — HEPARIN SODIUM 5000 UNIT(S): 5000 INJECTION INTRAVENOUS; SUBCUTANEOUS at 22:03

## 2017-05-16 RX ADMIN — Medication 300 MILLILITER(S): at 23:33

## 2017-05-16 RX ADMIN — Medication 300 MILLILITER(S): at 17:58

## 2017-05-16 RX ADMIN — PANTOPRAZOLE SODIUM 40 MILLIGRAM(S): 20 TABLET, DELAYED RELEASE ORAL at 07:34

## 2017-05-16 RX ADMIN — Medication 10 MILLIGRAM(S): at 16:14

## 2017-05-16 RX ADMIN — HEPARIN SODIUM 5000 UNIT(S): 5000 INJECTION INTRAVENOUS; SUBCUTANEOUS at 07:34

## 2017-05-16 RX ADMIN — HEPARIN SODIUM 5000 UNIT(S): 5000 INJECTION INTRAVENOUS; SUBCUTANEOUS at 13:36

## 2017-05-16 RX ADMIN — Medication 10 MILLIGRAM(S): at 23:33

## 2017-05-16 RX ADMIN — SIMVASTATIN 20 MILLIGRAM(S): 20 TABLET, FILM COATED ORAL at 23:33

## 2017-05-16 NOTE — DISCHARGE NOTE ADULT - CARE PROVIDERS DIRECT ADDRESSES
,[email or web address],DirectAddress_Unknown

## 2017-05-16 NOTE — DISCHARGE NOTE ADULT - INSTRUCTIONS
The patient may resume a regular diet. Watch for signs of infection; redness, swelling, fever, chills or heat, report such symptoms to the MD. Drink 6-8 glasses of fluids daily to promote hydration. No heavy lifting, pulling or pushing heavy objects. Follow up with the MD

## 2017-05-16 NOTE — DISCHARGE NOTE ADULT - PLAN OF CARE
WOUND CARE:    BATHING: Please do not submerge wound underwater. You may shower and/or sponge bathe.  ACTIVITY: No heavy lifting or straining. Otherwise, you may return to your usual level of physical activity. If you are taking narcotic pain medication (such as Percocet) DO NOT drive a car, operate machinery or make important decisions.  DIET: Return to your usual diet.  NOTIFY YOUR SURGEON IF: You have any bleeding that does not stop, any pus draining from your wound(s), any fever (over 100.4 F) or chills, persistent nausea/vomiting, persistent diarrhea, or if your pain is not controlled on your discharge pain medications.  FOLLOW-UP: Please follow up with your primary care physician in one week regarding your hospitalization  Please f/u with Dr Chan as an outpatient, please call [phone] to schedule appointment  Please f/u with Dr Stroud as an outpatient, please call [phone] to schedule appointment Resolution of GI bleed WOUND CARE:    BATHING: Please do not submerge wound underwater. You may shower and/or sponge bathe.  ACTIVITY: No heavy lifting or straining. Otherwise, you may return to your usual level of physical activity.  DIET: Return to your usual diet.  NOTIFY YOUR SURGEON IF: You have any bleeding that does not stop, any pus draining from your wound(s), any fever (over 100.4 F) or chills, persistent nausea/vomiting, persistent diarrhea, or if your pain is not controlled on your discharge pain medications.  FOLLOW-UP: Please follow up with your primary care physician in one week regarding your hospitalization  Please f/u with Dr Chan as an outpatient, please call [phone] to schedule appointment  Please f/u with Dr Stroud as an outpatient, please call [phone] to schedule appointment

## 2017-05-16 NOTE — DISCHARGE NOTE ADULT - HOSPITAL COURSE
66 F presents with bright red blood per rectum. Patient noticed bright red blood with her bowel movements over the last 1 day. Patient underwent a laparoscopic assisted sigmoid colectomy for diverticulitis with Dr. Davis in 2009. Supposedly, had one episode of R-sided diverticulitis after that surgery. Her was last colonoscopy was about 3 months ago with her gastroenterologist, Dr. Joseph. However, according to the patient, the colonoscope was unable to transverse to the R side. Patient has had no bloody bowel movements in the ED.    5/9 CT scan performed and showed Limited evaluation for a lower GI bleed due to residual contrast from   prior exam with the distal colon and diffuse colonic diverticuli. No   evidence of contrast extravasation in the visualized bowel.    5/10: LGIB, large bloody BM this am but hct remained stable. Trend hct, scope today.  5/11: total transfusion as of this am: 9U pRBC, 4U FFP, 1U plts.    Bleeding scan performed and showed no evidence of active bleeding site.    5/12: Patient underwent colonoscopy which showed patent end-to-side colo-colonic anastomosis.Diverticulosis in the entire examined colon. One 4 mm polyp in the ascending colon, removed with a cold biopsy forceps. Resected and retrieved. A single recently bleeding colonic angioectasia. Treated with argon plasma coagulation (APC).  Pt stable overnight without further transfusions.      5/13 pt transferred to general surgical floor    5/14 pt with BRBPR, H/H stable    Per Attending pt now stable for discharge. Pt to follow up with Dr Chan as an outpatient, instructed to call to schedule appointment. Pt to f/u with GI Dr Stroud as an outpatient, instructed to call to schedule appointment 66 F presents with bright red blood per rectum. Patient noticed bright red blood with her bowel movements over the last 1 day. Patient underwent a laparoscopic assisted sigmoid colectomy for diverticulitis with Dr. Davis in 2009. Supposedly, had one episode of R-sided diverticulitis after that surgery. Her was last colonoscopy was about 3 months ago with her gastroenterologist, Dr. Joseph. However, according to the patient, the colonoscope was unable to transverse to the R side. Patient has had no bloody bowel movements in the ED.    5/9 CT scan performed and showed Limited evaluation for a lower GI bleed due to residual contrast from   prior exam with the distal colon and diffuse colonic diverticuli. No   evidence of contrast extravasation in the visualized bowel.    5/10: LGIB, large bloody BM this am but hct remained stable. Trend hct, scope today.  5/11: total transfusion as of this am: 9U pRBC, 4U FFP, 1U plts.    Bleeding scan performed and showed no evidence of active bleeding site.    5/12: Patient underwent colonoscopy which showed patent end-to-side colo-colonic anastomosis.Diverticulosis in the entire examined colon. One 4 mm polyp in the ascending colon, removed with a cold biopsy forceps. Resected and retrieved. A single recently bleeding colonic angioectasia. Treated with argon plasma coagulation (APC).  Pt stable overnight without further transfusions.      5/13 pt transferred to general surgical floor    5/14 pt with BRBPR, H/H stable    5/17 pt had repeat colonoscopy with GI.     Per Attending pt now stable for discharge. Pt to follow up with Dr Chan as an outpatient, instructed to call to schedule appointment. Pt to f/u with GI Dr Stroud as an outpatient, instructed to call to schedule appointment 66 F presents with bright red blood per rectum. Patient noticed bright red blood with her bowel movements over the last 1 day. Patient underwent a laparoscopic assisted sigmoid colectomy for diverticulitis with Dr. Davis in 2009. Supposedly, had one episode of R-sided diverticulitis after that surgery. Her was last colonoscopy was about 3 months ago with her gastroenterologist, Dr. Joseph. However, according to the patient, the colonoscope was unable to transverse to the R side. Patient has had no bloody bowel movements in the ED.    5/9 CT scan performed and showed Limited evaluation for a lower GI bleed due to residual contrast from   prior exam with the distal colon and diffuse colonic diverticuli. No   evidence of contrast extravasation in the visualized bowel.    5/10: LGIB, large bloody BM this am but hct remained stable. Trend hct, scope today.  5/11: total transfusion as of this am: 9U pRBC, 4U FFP, 1U plts.    Bleeding scan performed and showed no evidence of active bleeding site.    5/12: Patient underwent colonoscopy which showed patent end-to-side colo-colonic anastomosis.Diverticulosis in the entire examined colon. One 4 mm polyp in the ascending colon, removed with a cold biopsy forceps. Resected and retrieved. A single recently bleeding colonic angioectasia. Treated with argon plasma coagulation (APC).  Pt stable overnight without further transfusions.      5/13 pt transferred to general surgical floor    5/14 pt with BRBPR, H/H stable    5/17 pt had repeat colonoscopy with GI which showed Multiple small and large-mouthed diverticula were found in the entire colon. There was a non bleeding ulcer in the ascending colon unsuccessful attempts were made to place resolution clips. There was no stigmata of old or recent bleeding.                                       Patients hgb/hct remained stable post procedure, diet was advanced and patient is ambulating without difficulty.     Per Attending pt now stable for discharge. Pt to follow up with Dr Chan as an outpatient, instructed to call to schedule appointment. Pt to f/u with GI Dr Stroud as an outpatient, instructed to call to schedule appointment in 1-2 weeks.

## 2017-05-16 NOTE — DISCHARGE NOTE ADULT - CONDITIONS AT DISCHARGE
Alert & oriented. Out of bed ambulating. Tolerating diet without nausea or vomiting. Voiding without difficulty. Vitals stable, afebrile. Understands all discharge instruction & will follow up with the MD

## 2017-05-16 NOTE — DISCHARGE NOTE ADULT - CARE PLAN
Instructions for follow-up, activity and diet:	WOUND CARE:    BATHING: Please do not submerge wound underwater. You may shower and/or sponge bathe.  ACTIVITY: No heavy lifting or straining. Otherwise, you may return to your usual level of physical activity. If you are taking narcotic pain medication (such as Percocet) DO NOT drive a car, operate machinery or make important decisions.  DIET: Return to your usual diet.  NOTIFY YOUR SURGEON IF: You have any bleeding that does not stop, any pus draining from your wound(s), any fever (over 100.4 F) or chills, persistent nausea/vomiting, persistent diarrhea, or if your pain is not controlled on your discharge pain medications.  FOLLOW-UP: Please follow up with your primary care physician in one week regarding your hospitalization  Please f/u with Dr Chan as an outpatient, please call [phone] to schedule appointment  Please f/u with Dr Stroud as an outpatient, please call [phone] to schedule appointment Principal Discharge DX:	Lower GI bleed  Goal:	Resolution of GI bleed  Instructions for follow-up, activity and diet:	WOUND CARE:    BATHING: Please do not submerge wound underwater. You may shower and/or sponge bathe.  ACTIVITY: No heavy lifting or straining. Otherwise, you may return to your usual level of physical activity. If you are taking narcotic pain medication (such as Percocet) DO NOT drive a car, operate machinery or make important decisions.  DIET: Return to your usual diet.  NOTIFY YOUR SURGEON IF: You have any bleeding that does not stop, any pus draining from your wound(s), any fever (over 100.4 F) or chills, persistent nausea/vomiting, persistent diarrhea, or if your pain is not controlled on your discharge pain medications.  FOLLOW-UP: Please follow up with your primary care physician in one week regarding your hospitalization  Please f/u with Dr Chan as an outpatient, please call [phone] to schedule appointment  Please f/u with Dr Stroud as an outpatient, please call [phone] to schedule appointment Principal Discharge DX:	Lower GI bleed  Goal:	Resolution of GI bleed  Instructions for follow-up, activity and diet:	WOUND CARE:    BATHING: Please do not submerge wound underwater. You may shower and/or sponge bathe.  ACTIVITY: No heavy lifting or straining. Otherwise, you may return to your usual level of physical activity. If you are taking narcotic pain medication (such as Percocet) DO NOT drive a car, operate machinery or make important decisions.  DIET: Return to your usual diet.  NOTIFY YOUR SURGEON IF: You have any bleeding that does not stop, any pus draining from your wound(s), any fever (over 100.4 F) or chills, persistent nausea/vomiting, persistent diarrhea, or if your pain is not controlled on your discharge pain medications.  FOLLOW-UP: Please follow up with your primary care physician in one week regarding your hospitalization  Please f/u with Dr Chan as an outpatient, please call [phone] to schedule appointment  Please f/u with Dr Stroud as an outpatient, please call [phone] to schedule appointment Principal Discharge DX:	Lower GI bleed  Goal:	Resolution of GI bleed  Instructions for follow-up, activity and diet:	WOUND CARE:    BATHING: Please do not submerge wound underwater. You may shower and/or sponge bathe.  ACTIVITY: No heavy lifting or straining. Otherwise, you may return to your usual level of physical activity.  DIET: Return to your usual diet.  NOTIFY YOUR SURGEON IF: You have any bleeding that does not stop, any pus draining from your wound(s), any fever (over 100.4 F) or chills, persistent nausea/vomiting, persistent diarrhea, or if your pain is not controlled on your discharge pain medications.  FOLLOW-UP: Please follow up with your primary care physician in one week regarding your hospitalization  Please f/u with Dr Chan as an outpatient, please call [phone] to schedule appointment  Please f/u with Dr Stroud as an outpatient, please call [phone] to schedule appointment Principal Discharge DX:	Lower GI bleed  Goal:	Resolution of GI bleed  Instructions for follow-up, activity and diet:	WOUND CARE:    BATHING: Please do not submerge wound underwater. You may shower and/or sponge bathe.  ACTIVITY: No heavy lifting or straining. Otherwise, you may return to your usual level of physical activity.  DIET: Return to your usual diet.  NOTIFY YOUR SURGEON IF: You have any bleeding that does not stop, any pus draining from your wound(s), any fever (over 100.4 F) or chills, persistent nausea/vomiting, persistent diarrhea, or if your pain is not controlled on your discharge pain medications.  FOLLOW-UP: Please follow up with your primary care physician in one week regarding your hospitalization  Please f/u with Dr Chan as an outpatient, please call [phone] to schedule appointment  Please f/u with Dr Stroud as an outpatient, please call [phone] to schedule appointment

## 2017-05-16 NOTE — DISCHARGE NOTE ADULT - MEDICATION SUMMARY - MEDICATIONS TO TAKE
I will START or STAY ON the medications listed below when I get home from the hospital:    simvastatin 20 mg oral tablet  -- 1 tab(s) by mouth once a day (at bedtime)  -- Indication: For HLD    omeprazole 40 mg oral delayed release capsule  -- 1 cap(s) by mouth every other day  -- Indication: For GERD

## 2017-05-16 NOTE — DISCHARGE NOTE ADULT - PATIENT PORTAL LINK FT
“You can access the Follow[employer] Patient Portal, offered by [facility], by registering with the following website: [email or web address]”

## 2017-05-16 NOTE — DISCHARGE NOTE ADULT - CARE PROVIDER_API CALL
John Paul Chan (MD), Surgery  [address]  [place] [address]  Phone: [phone]  Fax: [phone]    Ricardo Stroud (DO), Internal Medicine  [address] [place] [address]  Phone: [phone]  Fax: [phone]

## 2017-05-17 LAB
BUN SERPL-MCNC: 8 MG/DL — SIGNIFICANT CHANGE UP (ref 7–23)
CALCIUM SERPL-MCNC: 8.8 MG/DL — SIGNIFICANT CHANGE UP (ref 8.4–10.5)
CHLORIDE SERPL-SCNC: 106 MMOL/L — SIGNIFICANT CHANGE UP (ref 98–107)
CO2 SERPL-SCNC: 24 MMOL/L — SIGNIFICANT CHANGE UP (ref 22–31)
CREAT SERPL-MCNC: 0.66 MG/DL — SIGNIFICANT CHANGE UP (ref 0.5–1.3)
GLUCOSE SERPL-MCNC: 113 MG/DL — HIGH (ref 70–99)
HCT VFR BLD CALC: 27.3 % — LOW (ref 34.5–45)
HGB BLD-MCNC: 8.9 G/DL — LOW (ref 11.5–15.5)
MAGNESIUM SERPL-MCNC: 2.1 MG/DL — SIGNIFICANT CHANGE UP (ref 1.6–2.6)
MCHC RBC-ENTMCNC: 29.1 PG — SIGNIFICANT CHANGE UP (ref 27–34)
MCHC RBC-ENTMCNC: 32.6 % — SIGNIFICANT CHANGE UP (ref 32–36)
MCV RBC AUTO: 89.2 FL — SIGNIFICANT CHANGE UP (ref 80–100)
PHOSPHATE SERPL-MCNC: 4.2 MG/DL — SIGNIFICANT CHANGE UP (ref 2.5–4.5)
PLATELET # BLD AUTO: 211 K/UL — SIGNIFICANT CHANGE UP (ref 150–400)
PMV BLD: 9.7 FL — SIGNIFICANT CHANGE UP (ref 7–13)
POTASSIUM SERPL-MCNC: 3.8 MMOL/L — SIGNIFICANT CHANGE UP (ref 3.5–5.3)
POTASSIUM SERPL-SCNC: 3.8 MMOL/L — SIGNIFICANT CHANGE UP (ref 3.5–5.3)
RBC # BLD: 3.06 M/UL — LOW (ref 3.8–5.2)
RBC # FLD: 18 % — HIGH (ref 10.3–14.5)
SODIUM SERPL-SCNC: 145 MMOL/L — SIGNIFICANT CHANGE UP (ref 135–145)
WBC # BLD: 6.44 K/UL — SIGNIFICANT CHANGE UP (ref 3.8–10.5)
WBC # FLD AUTO: 6.44 K/UL — SIGNIFICANT CHANGE UP (ref 3.8–10.5)

## 2017-05-17 RX ORDER — PANTOPRAZOLE SODIUM 20 MG/1
40 TABLET, DELAYED RELEASE ORAL ONCE
Qty: 0 | Refills: 0 | Status: COMPLETED | OUTPATIENT
Start: 2017-05-17 | End: 2017-05-17

## 2017-05-17 RX ORDER — POTASSIUM CHLORIDE 20 MEQ
10 PACKET (EA) ORAL
Qty: 0 | Refills: 0 | Status: COMPLETED | OUTPATIENT
Start: 2017-05-17 | End: 2017-05-17

## 2017-05-17 RX ADMIN — PANTOPRAZOLE SODIUM 40 MILLIGRAM(S): 20 TABLET, DELAYED RELEASE ORAL at 21:54

## 2017-05-17 RX ADMIN — Medication 100 MILLIEQUIVALENT(S): at 12:30

## 2017-05-17 RX ADMIN — SIMVASTATIN 20 MILLIGRAM(S): 20 TABLET, FILM COATED ORAL at 21:55

## 2017-05-17 RX ADMIN — HEPARIN SODIUM 5000 UNIT(S): 5000 INJECTION INTRAVENOUS; SUBCUTANEOUS at 21:55

## 2017-05-17 RX ADMIN — Medication 100 MILLIEQUIVALENT(S): at 10:30

## 2017-05-17 RX ADMIN — SODIUM CHLORIDE 100 MILLILITER(S): 9 INJECTION, SOLUTION INTRAVENOUS at 09:11

## 2017-05-17 RX ADMIN — Medication 100 MILLIEQUIVALENT(S): at 09:11

## 2017-05-17 NOTE — PROGRESS NOTE ADULT - SUBJECTIVE AND OBJECTIVE BOX
Postprocedure  S: Patient underwent colonoscopy.  Colonoscopy revealed diffuse diverticulosis and an ulcer in the ascending colon.  No signs of bleeding.  Patient tolerated procedure well. Patient denies chest pain, shortness of breath, nausea, vomiting, lightheadedness, or dizziness.  Pain was well controlled.      O:    I & Os for current day (as of 05-17 @ 16:54)  =============================================  IN:    dextrose 5% + sodium chloride 0.45%.: 600 ml    Total IN: 600 ml  ---------------------------------------------  OUT:    Total OUT: 0 ml  ---------------------------------------------  Total NET: 600 ml    H/H: 27.3<L>/8.9<L>05-17 @ 06:49    Na:145  K:3.8  Cl:106  Bicarb:24  BUN:8  Cr:0.66  Glucose:113    Gen: NAD  Abd: Soft, nontender, nondistended.  No palpable masses.

## 2017-05-17 NOTE — PROGRESS NOTE ADULT - ASSESSMENT
Diverticular bleed s/p Colonoscopy with polypectomy and diverticulosis noted  -cbc qd  -transfuse prn  - PPI qd  -repeat colonoscopy today  -npo Diverticular bleed s/p Colonoscopy 5/12 with polypectomy and diverticulosis noted and repeat colonoscopy on 5/17 with diverticulosis noted as above with questionable nonbleeding ulcer vs recent polypectomy location  -cbc qd  -hgb stabilized  - PPI qd  -senna/colace  -outpatient GI followup in office as discussed with patient in 2wks [phone]; pt wishes to call to make appointment  -resume diet  -dc planning

## 2017-05-17 NOTE — PROGRESS NOTE ADULT - ASSESSMENT
67 y/o Female with Gastrointestinal hemorrhage s/p colonoscopy    DVT PPX  OOB  IS  Am labs  Reg diet  Possible d/c in am

## 2017-05-17 NOTE — PROGRESS NOTE ADULT - SUBJECTIVE AND OBJECTIVE BOX
INTERVAL HPI/OVERNIGHT EVENTS:  planned for repeat colon today.minimal GIB while prepping    MEDICATIONS  (STANDING):  heparin  Injectable 5000Unit(s) SubCutaneous every 8 hours  pantoprazole    Tablet 40milliGRAM(s) Oral before breakfast  simvastatin 20milliGRAM(s) Oral at bedtime  dextrose 5% + sodium chloride 0.45%. 1000milliLiter(s) IV Continuous <Continuous>  ondansetron Injectable 4milliGRAM(s) IV Push once  potassium chloride  10 mEq/100 mL IVPB 10milliEquivalent(s) IV Intermittent every 1 hour    MEDICATIONS  (PRN):  artificial  tears Solution 1Drop(s) Both EYES four times a day PRN Dry Eyes  diphenhydrAMINE   Capsule 25milliGRAM(s) Oral every 4 hours PRN Rash and/or Itching      Allergies    Bactrim (Hives)  dust (Hives)  sulfa drugs (Unknown)  sulfadoxine (Hives)  trimethoprim (Unknown)    Intolerances        ROS:   General:  No wt loss, fevers, chills, night sweats, fatigue,   Eyes:  Good vision, no reported pain  ENT:  No sore throat, pain, runny nose, dysphagia  CV:  No pain, palpitations, hypo/hypertension  Resp:  No dyspnea, cough, tachypnea, wheezing  GI:  No pain, No nausea, No vomiting, No diarrhea, No constipation, No weight loss, No fever, No pruritis, No rectal bleeding, No tarry stools, No dysphagia,  :  No pain, bleeding, incontinence, nocturia  Muscle:  No pain, weakness  Neuro:  No weakness, tingling, memory problems  Psych:  No fatigue, insomnia, mood problems, depression  Endocrine:  No polyuria, polydipsia, cold/heat intolerance  Heme:  No petechiae, ecchymosis, easy bruisability  Skin:  No rash, tattoos, scars, edema      PHYSICAL EXAM:   Vital Signs:  Vital Signs Last 24 Hrs  T(C): 36.8, Max: 36.9 (05-16 @ 17:33)  T(F): 98.3, Max: 98.4 (05-16 @ 17:33)  HR: 71 (71 - 91)  BP: 120/82 (115/71 - 141/85)  BP(mean): --  RR: 18 (16 - 18)  SpO2: 99% (99% - 100%)  Daily     Daily     GENERAL:  Appears stated age, well-groomed, well-nourished, no distress  HEENT:  NC/AT,  conjunctivae clear and pink, no thyromegaly, nodules, adenopathy, no JVD, sclera -anicteric  CHEST:  Full & symmetric excursion, no increased effort, breath sounds clear  HEART:  Regular rhythm, S1, S2, no murmur/rub/S3/S4, no abdominal bruit, no edema  ABDOMEN:  Soft, non-tender, non-distended, normoactive bowel sounds,  no masses ,no hepato-splenomegaly, no signs of chronic liver disease  EXTEREMITIES:  no cyanosis,clubbing or edema  SKIN:  No rash/erythema/ecchymoses/petechiae/wounds/abscess/warm/dry  NEURO:  Alert, oriented, no asterixis, no tremor, no encephalopathy      LABS:                        8.9    6.44  )-----------( 211      ( 17 May 2017 06:49 )             27.3     05-17    145  |  106  |  8   ----------------------------<  113<H>  3.8   |  24  |  0.66    Ca    8.8      17 May 2017 06:48  Phos  4.2     05-17  Mg     2.1     05-17            RADIOLOGY & ADDITIONAL TESTS: INTERVAL HPI/OVERNIGHT EVENTS:  pt s+e and reports no n/v/d. pt reports yesterday evening have small amount of brown colored stools, no further red stool. Tolerating diet.   "nervous to go home"    MEDICATIONS  (STANDING):  heparin  Injectable 5000Unit(s) SubCutaneous every 8 hours  pantoprazole    Tablet 40milliGRAM(s) Oral before breakfast  simvastatin 20milliGRAM(s) Oral at bedtime  dextrose 5% + sodium chloride 0.45%. 1000milliLiter(s) IV Continuous <Continuous>  ondansetron Injectable 4milliGRAM(s) IV Push once  potassium chloride  10 mEq/100 mL IVPB 10milliEquivalent(s) IV Intermittent every 1 hour    MEDICATIONS  (PRN):  artificial  tears Solution 1Drop(s) Both EYES four times a day PRN Dry Eyes  diphenhydrAMINE   Capsule 25milliGRAM(s) Oral every 4 hours PRN Rash and/or Itching      Allergies    Bactrim (Hives)  dust (Hives)  sulfa drugs (Unknown)  sulfadoxine (Hives)  trimethoprim (Unknown)    Intolerances        ROS:   General:  No wt loss, fevers, chills, night sweats, fatigue,   Eyes:  Good vision, no reported pain  ENT:  No sore throat, pain, runny nose, dysphagia  CV:  No pain, palpitations, hypo/hypertension  Resp:  No dyspnea, cough, tachypnea, wheezing  GI:  No pain, No nausea, No vomiting, No diarrhea, No constipation, No weight loss, No fever, No pruritis, No rectal bleeding, No tarry stools, No dysphagia,  :  No pain, bleeding, incontinence, nocturia  Muscle:  No pain, weakness  Neuro:  No weakness, tingling, memory problems  Psych:  No fatigue, insomnia, mood problems, depression  Endocrine:  No polyuria, polydipsia, cold/heat intolerance  Heme:  No petechiae, ecchymosis, easy bruisability  Skin:  No rash, tattoos, scars, edema      PHYSICAL EXAM:   Vital Signs:  Vital Signs Last 24 Hrs  T(C): 36.8, Max: 36.9 (05-16 @ 17:33)  T(F): 98.3, Max: 98.4 (05-16 @ 17:33)  HR: 71 (71 - 91)  BP: 120/82 (115/71 - 141/85)  BP(mean): --  RR: 18 (16 - 18)  SpO2: 99% (99% - 100%)  Daily     Daily     GENERAL:  Appears stated age, well-groomed, well-nourished, no distress  HEENT:  NC/AT,  conjunctivae clear and pink, no thyromegaly, nodules, adenopathy, no JVD, sclera -anicteric  CHEST:  Full & symmetric excursion, no increased effort, breath sounds clear  HEART:  Regular rhythm, S1, S2, no murmur/rub/S3/S4, no abdominal bruit, no edema  ABDOMEN:  Soft, non-tender, non-distended, normoactive bowel sounds,  no masses ,no hepato-splenomegaly, no signs of chronic liver disease  EXTEREMITIES:  no cyanosis,clubbing or edema  SKIN:  No rash/erythema/ecchymoses/petechiae/wounds/abscess/warm/dry  NEURO:  Alert, oriented, no asterixis, no tremor, no encephalopathy      LABS:                        8.9    6.44  )-----------( 211      ( 17 May 2017 06:49 )             27.3     05-17    145  |  106  |  8   ----------------------------<  113<H>  3.8   |  24  |  0.66    Ca    8.8      17 May 2017 06:48  Phos  4.2     05-17  Mg     2.1     05-17            RADIOLOGY & ADDITIONAL TESTS:    5/17 Colonoscopy -- There was an end to side anastomosis seen. Multiple small and large-mouthed diverticula were found in the entire colon. There was a non bleeding ulcer in the ascending colon unsuccessful attempts were made to place resolution clips. There was no stigmata of old or recent bleeding. Diverticulosis in the entire examined colon.

## 2017-05-18 VITALS
DIASTOLIC BLOOD PRESSURE: 71 MMHG | OXYGEN SATURATION: 100 % | TEMPERATURE: 99 F | SYSTOLIC BLOOD PRESSURE: 105 MMHG | RESPIRATION RATE: 16 BRPM | HEART RATE: 90 BPM

## 2017-05-18 LAB
BUN SERPL-MCNC: 8 MG/DL — SIGNIFICANT CHANGE UP (ref 7–23)
CALCIUM SERPL-MCNC: 9.1 MG/DL — SIGNIFICANT CHANGE UP (ref 8.4–10.5)
CHLORIDE SERPL-SCNC: 106 MMOL/L — SIGNIFICANT CHANGE UP (ref 98–107)
CO2 SERPL-SCNC: 24 MMOL/L — SIGNIFICANT CHANGE UP (ref 22–31)
CREAT SERPL-MCNC: 0.67 MG/DL — SIGNIFICANT CHANGE UP (ref 0.5–1.3)
GLUCOSE SERPL-MCNC: 89 MG/DL — SIGNIFICANT CHANGE UP (ref 70–99)
HCT VFR BLD CALC: 28.4 % — LOW (ref 34.5–45)
HGB BLD-MCNC: 9.1 G/DL — LOW (ref 11.5–15.5)
MAGNESIUM SERPL-MCNC: 2.1 MG/DL — SIGNIFICANT CHANGE UP (ref 1.6–2.6)
MCHC RBC-ENTMCNC: 29.2 PG — SIGNIFICANT CHANGE UP (ref 27–34)
MCHC RBC-ENTMCNC: 32 % — SIGNIFICANT CHANGE UP (ref 32–36)
MCV RBC AUTO: 91 FL — SIGNIFICANT CHANGE UP (ref 80–100)
PLATELET # BLD AUTO: 219 K/UL — SIGNIFICANT CHANGE UP (ref 150–400)
PMV BLD: 9.8 FL — SIGNIFICANT CHANGE UP (ref 7–13)
POTASSIUM SERPL-MCNC: 3.8 MMOL/L — SIGNIFICANT CHANGE UP (ref 3.5–5.3)
POTASSIUM SERPL-SCNC: 3.8 MMOL/L — SIGNIFICANT CHANGE UP (ref 3.5–5.3)
RBC # BLD: 3.12 M/UL — LOW (ref 3.8–5.2)
RBC # FLD: 17.9 % — HIGH (ref 10.3–14.5)
SODIUM SERPL-SCNC: 142 MMOL/L — SIGNIFICANT CHANGE UP (ref 135–145)
WBC # BLD: 6.11 K/UL — SIGNIFICANT CHANGE UP (ref 3.8–10.5)
WBC # FLD AUTO: 6.11 K/UL — SIGNIFICANT CHANGE UP (ref 3.8–10.5)

## 2017-05-18 RX ADMIN — PANTOPRAZOLE SODIUM 40 MILLIGRAM(S): 20 TABLET, DELAYED RELEASE ORAL at 05:16

## 2017-05-18 RX ADMIN — HEPARIN SODIUM 5000 UNIT(S): 5000 INJECTION INTRAVENOUS; SUBCUTANEOUS at 05:16

## 2017-05-18 NOTE — PROGRESS NOTE ADULT - ASSESSMENT
Diverticular bleed s/p Colonoscopy 5/12 with polypectomy and diverticulosis noted and repeat colonoscopy on 5/17 with diverticulosis noted as above with questionable nonbleeding ulcer vs recent polypectomy location  -cbc qd  -hgb stabilized  - PPI qd  -senna/colace  -outpatient GI followup in office as discussed with patient in 2wks [phone]; pt wishes to call to make appointment  -resume diet  -dc planning

## 2017-05-18 NOTE — PROGRESS NOTE ADULT - SUBJECTIVE AND OBJECTIVE BOX
GENERAL SURGERY DAILY PROGRESS NOTE:     Postoperative Day:    Status post:     Subjective:              Objective:    MEDICATIONS  (STANDING):  heparin  Injectable 5000Unit(s) SubCutaneous every 8 hours  pantoprazole    Tablet 40milliGRAM(s) Oral before breakfast  simvastatin 20milliGRAM(s) Oral at bedtime  ondansetron Injectable 4milliGRAM(s) IV Push once    MEDICATIONS  (PRN):  artificial  tears Solution 1Drop(s) Both EYES four times a day PRN Dry Eyes  diphenhydrAMINE   Capsule 25milliGRAM(s) Oral every 4 hours PRN Rash and/or Itching      Vital Signs Last 24 Hrs  T(C): 36.7, Max: 36.9 (05-17 @ 18:00)  T(F): 98.1, Max: 98.4 (05-17 @ 18:00)  HR: 83 (71 - 93)  BP: 119/72 (115/71 - 122/88)  BP(mean): --  RR: 17 (16 - 18)  SpO2: 99% (96% - 99%)    I&O's Detail  I & Os for 24h ending 17 May 2017 07:00  =============================================  IN:    dextrose 5% + sodium chloride 0.45%.: 600 ml    Total IN: 600 ml  ---------------------------------------------  OUT:    Total OUT: 0 ml  ---------------------------------------------  Total NET: 600 ml    I & Os for current day (as of 18 May 2017 00:19)  =============================================  IN:    dextrose 5% + sodium chloride 0.45%.: 900 ml    IV PiggyBack: 300 ml    Total IN: 1200 ml  ---------------------------------------------  OUT:    Total OUT: 0 ml  ---------------------------------------------  Total NET: 1200 ml      Daily     Daily     LABS:                        8.9    6.44  )-----------( 211      ( 17 May 2017 06:49 )             27.3     05-17    145  |  106  |  8   ----------------------------<  113<H>  3.8   |  24  |  0.66    Ca    8.8      17 May 2017 06:48  Phos  4.2     05-17  Mg     2.1     05-17 A-team progress note  S; Pain controlled.  No nausea, vomiting, fevers, chills, dizziness, lightheadedness, or chest pain.    O:  Vitals: T(C): 37.1, Max: 37.1 ( @ 05:12)  HR: 90 (71 - 93)  BP: 105/71 (98/63 - 122/88)  RR: 16 (15 - 18)  SpO2: 100% (96% - 100%)  Wt(kg): --  Labs:  H/H:28.4/9. @ 06:15  Na:142     Cl:106     BUN:8   G  K:3.8     CO2:24    Cr:0.67  PT:--    INR:--    aPTT:--    I/O :    I & Os for current day (as of  @ 09:49)  =============================================  IN:    dextrose 5% + sodium chloride 0.45%.: 900 ml    IV PiggyBack: 300 ml    Total IN: 1200 ml  ---------------------------------------------  OUT:    Total OUT: 0 ml  ---------------------------------------------  Total NET: 1200 ml    PE:  Gen:NAD  Abd:soft, nontender, nondistended

## 2017-05-18 NOTE — PROGRESS NOTE ADULT - SUBJECTIVE AND OBJECTIVE BOX
INTERVAL HPI/OVERNIGHT EVENTS:  pt s+e and reports no n/v/d. pt reports yesterday evening have small amount of brown colored stools, no further red stool. Tolerating diet.   "nervous to go home"    MEDICATIONS  (STANDING):  heparin  Injectable 5000Unit(s) SubCutaneous every 8 hours  pantoprazole    Tablet 40milliGRAM(s) Oral before breakfast  simvastatin 20milliGRAM(s) Oral at bedtime  dextrose 5% + sodium chloride 0.45%. 1000milliLiter(s) IV Continuous <Continuous>  ondansetron Injectable 4milliGRAM(s) IV Push once  potassium chloride  10 mEq/100 mL IVPB 10milliEquivalent(s) IV Intermittent every 1 hour    MEDICATIONS  (PRN):  artificial  tears Solution 1Drop(s) Both EYES four times a day PRN Dry Eyes  diphenhydrAMINE   Capsule 25milliGRAM(s) Oral every 4 hours PRN Rash and/or Itching      Allergies    Bactrim (Hives)  dust (Hives)  sulfa drugs (Unknown)  sulfadoxine (Hives)  trimethoprim (Unknown)    Intolerances        ROS:   General:  No wt loss, fevers, chills, night sweats, fatigue,   Eyes:  Good vision, no reported pain  ENT:  No sore throat, pain, runny nose, dysphagia  CV:  No pain, palpitations, hypo/hypertension  Resp:  No dyspnea, cough, tachypnea, wheezing  GI:  No pain, No nausea, No vomiting, No diarrhea, No constipation, No weight loss, No fever, No pruritis, No rectal bleeding, No tarry stools, No dysphagia,  :  No pain, bleeding, incontinence, nocturia  Muscle:  No pain, weakness  Neuro:  No weakness, tingling, memory problems  Psych:  No fatigue, insomnia, mood problems, depression  Endocrine:  No polyuria, polydipsia, cold/heat intolerance  Heme:  No petechiae, ecchymosis, easy bruisability  Skin:  No rash, tattoos, scars, edema      PHYSICAL EXAM:   Vital Signs:  Vital Signs Last 24 Hrs  T(C): 36.8, Max: 36.9 (05-16 @ 17:33)  T(F): 98.3, Max: 98.4 (05-16 @ 17:33)  HR: 71 (71 - 91)  BP: 120/82 (115/71 - 141/85)  BP(mean): --  RR: 18 (16 - 18)  SpO2: 99% (99% - 100%)  Daily     Daily     GENERAL:  Appears stated age, well-groomed, well-nourished, no distress  HEENT:  NC/AT,  conjunctivae clear and pink, no thyromegaly, nodules, adenopathy, no JVD, sclera -anicteric  CHEST:  Full & symmetric excursion, no increased effort, breath sounds clear  HEART:  Regular rhythm, S1, S2, no murmur/rub/S3/S4, no abdominal bruit, no edema  ABDOMEN:  Soft, non-tender, non-distended, normoactive bowel sounds,  no masses ,no hepato-splenomegaly, no signs of chronic liver disease  EXTEREMITIES:  no cyanosis,clubbing or edema  SKIN:  No rash/erythema/ecchymoses/petechiae/wounds/abscess/warm/dry  NEURO:  Alert, oriented, no asterixis, no tremor, no encephalopathy      LABS:                        8.9    6.44  )-----------( 211      ( 17 May 2017 06:49 )             27.3     05-17    145  |  106  |  8   ----------------------------<  113<H>  3.8   |  24  |  0.66    Ca    8.8      17 May 2017 06:48  Phos  4.2     05-17  Mg     2.1     05-17            RADIOLOGY & ADDITIONAL TESTS:    5/17 Colonoscopy -- There was an end to side anastomosis seen. Multiple small and large-mouthed diverticula were found in the entire colon. There was a non bleeding ulcer in the ascending colon unsuccessful attempts were made to place resolution clips. There was no stigmata of old or recent bleeding. Diverticulosis in the entire examined colon.

## 2017-05-18 NOTE — PROGRESS NOTE ADULT - ASSESSMENT
67 yo F with GIB s/p Colonoscopy 65 yo F with GIB s/p Colonoscopy  Pain control  OOB  reg diet  D/c home today.

## 2018-01-01 NOTE — ED PROVIDER NOTE - TOBACCO USE
Chief complaint:   Chief Complaint   Patient presents with   • Cough       Vitals:  Visit Vitals  Temp 97.8 °F (36.6 °C) (Axillary)   Wt 5.429 kg       HISTORY OF PRESENT ILLNESS     Cough   This is a new problem. The current episode started in the past 7 days. The problem occurs daily. The problem has been unchanged. Associated symptoms include congestion and coughing (mild, dry). Pertinent negatives include no fever.   Patient's brother currently has croup.    Other significant problems:  Patient Active Problem List    Diagnosis Date Noted   • Late  infant of 36 5/7 weeks gestation 2018     Priority: Low       PAST MEDICAL, FAMILY AND SOCIAL HISTORY     Medications:  Current Outpatient Medications   Medication   • multiVitamin - ped, thera w/iron (POLY-VI-SOL + IRON) solution     No current facility-administered medications for this visit.        Allergies:  ALLERGIES:  No Known Allergies    Past Medical  History/Surgeries:  No past medical history on file.    No past surgical history on file.    Family History:  No family history on file.    Social History:  Social History     Tobacco Use   • Smoking status: Not on file   Substance Use Topics   • Alcohol use: Not on file       REVIEW OF SYSTEMS     Review of Systems   Constitutional: Negative.  Negative for fever.   HENT: Positive for congestion.    Respiratory: Positive for cough (mild, dry). Negative for wheezing and stridor.        PHYSICAL EXAM     Physical Exam   Constitutional: No distress.   HENT:   Right Ear: Tympanic membrane normal.   Left Ear: Tympanic membrane normal.   Mouth/Throat: Oropharynx is clear. Pharynx is normal.   Eyes: Conjunctivae are normal.   Neck: Neck supple.   Cardiovascular: Regular rhythm.   No murmur heard.  Pulmonary/Chest: Effort normal. No respiratory distress. She has no wheezes. She exhibits no retraction.   Neurological: She is alert.   Skin: Skin is warm. No rash noted.       ASSESSMENT/PLAN     DX: URI.  Plan:  supportive care.  Follow up as needed.   Never smoker

## 2023-02-02 NOTE — ED ADULT NURSE REASSESSMENT NOTE - SYMPTOMS
Reason for follow up: CRT-D/AF/VT    Impression:   · Medtronic CRT-D   · Implanted 12/30/2019 in the setting of severe ischemic cardiomyopathy    · AP: 49%.  BiV pacing: (effective) 93.7%. Not dependent.  · Episodes:  · 1 NSVT in 3/2022, lasting 2 seconds.   · No VT monitored episodes. No AF episodes.  · OptiVol on the rise but not elevated  · Sustained ventricular tachycardia  · 45 sec of WCT on 7/12/2020; pt was asymptomatic  · Device interrogation revealed sustained VT - program changes were made which included decreasing his VT monitor zone  · Oral Amiodarone initiated on 7/12/2020 in addition to IV  · On Carvedilol-increase in dosing limited due to dizziness  · Cardiology consulted - felt secondary to severe CMP - plans for medical management   · High risk medication, Amiodarone 100 mg daily for sustained VT  · Initiated on 7/12/2020  · Dose decreased on 1/25/21 to reduce the risks of developing adverse effects    · Monitoring: ALT/AST/TSH WNL 1/16/23. CXR 7/2022.   · Paroxysmal atrial fibrillation  · Denies overt symptoms   · Found postoperatively following CABG (07/2019) developed brief episode lasting 10 minutes in duration, was on prophylactic Amiodarone, discontinued 7/14/19 due to nausea. Established with EP during admission as an inpatient.   · CHADSVASc 4 (Age, CAD, CHF)   · Anticoagulated on Warfarin   · Initiated on 7/15/19 for PE   · F/B MARTIR Cormier  · Severe ischemic cardiomyopathy with HFrEF  · EF <35% since at least 7/8/19  · On GDMT: Coreg (started 7/14/19) and Aldactone (started 7/16/19) Previously on Lisinopril, discontinued due to hypotension, dizziness.  · Cardiologist:Dr. Nam, MARTIR Cormier  · NYHA Class II-III  · Coronary artery diease s/p CABG x3 with balloon pump 7/10/19   · Cardiologist: Dr. Nam   · Cardiac Imaging  · Echo 7/12/20: EF 15%, grade II/IV DD, IVS 1.3 cm  · NM viability 7/9/19: Myocardium appears largely viable, EF 22%  · Cardiac Cath  7/6/19: Multivessel CAD including moderate distal LM, severe prox to mid bifurcation in the LAD, mod to severe ostial Cx and severe ostial large first marginal        Recommendations:   · Device interrogation today without concerns for AF or sustained VT. Will continue Amiodarone 100 mg daily for VT suppression.   · Up to date on labs/CXR for monitoring of Amiodarone use.  · Remote check in 3 months.   · Follow up in 6 months with [facility].      HPI:  Tong Curtis is a 75 year old male seen in clinic for follow up of a Medtronic CRT-D implanted in the setting of severe ischemic cardiomyopathy on 12/30/19. He also carries a history of sustained VT and is on Amiodarone for VT suppression along with paroxysmal atrial fibrillation. Anticoagulated on Warfarin. Reports noting fatigue. He is accompanied by his wife in office today.      PAST MEDICAL HX:    Esophageal hiatal hernia                                      Asthma                                                        Hypothyroidism                                                Diverticulitis                                                Colitis                                                       Ureteral calculi                                                Comment: right     Arthritis                                                     Chronic kidney disease                                          Comment: Kidney Stones    CAD (coronary artery disease)                                 Congestive cardiac failure (CMS/HCC)                          High cholesterol                                              Essential (primary) hypertension                              Medtronic CRT-D implantation 12/30/19                         Allergies and Medications were reviewed    ROS: Above review of system completed by nursing staff and reviewed by provider. Pertinent items are noted in the history of present illness (HPI).     PHYSICAL EXAM:  Visit Vitals  BP (!)  142/80 (BP Location: RUE - Right upper extremity, Patient Position: Sitting, Cuff Size: Large Adult)   Pulse 82   Ht 5' 11\" (1.803 m)   Wt 132.2 kg (291 lb 6.4 oz)   SpO2 97%   BMI 40.64 kg/m²     GENERAL:  Cooperative, sitting comfortably, in no acute distress.  HEAD: Normocephalic, Non-traumatic  NECK: No masses  CARDIOVASCULAR:   Regular rate and rhythm.  RESPIRATORY:  No respiratory distress, audible wheeze, tripoding.  EXTREMITIES: No clubbing or cyanosis. No pitting edema.  NEURO:  Alert and oriented to person, place, and time. No obvious motor deficits  PSYCHIATRIC:  Mood and affect normal.   INTEGUMENTARY:  Warm, no rashes or nodules.    I have personally reviewed and interpreted EKG below.  I have reviewed and summarized old records as per the impression section.    EKG: AV dual paced rhythm with prolonged AV conduction, rate 82    Device: Dr. Holden personally reviewed the device interrogation/programming while patient was in the office. Normal device function was noted.     Labs:   Lab Results   Component Value Date    WBC 8.9 12/13/2022    WBC 8.7 12/30/2019    HGB 12.3 (L) 12/13/2022    HGB 13.3 12/30/2019    HCT 39.3 12/13/2022    HCT 41.7 12/30/2019     12/13/2022     12/30/2019     04/11/2013    INR 2.0 01/23/2023    INR 2.5 11/01/2022    POTASSIUM 4.3 01/16/2023    POTASSIUM 4.3 12/30/2019    BUN 28 (H) 01/16/2023    BUN 21 (H) 12/30/2019    CREATININE 1.35 (H) 01/16/2023    CREATININE 1.26 (H) 12/30/2019    TSH 2.750 01/16/2023    TSH 0.149 (L) 01/20/2020       On 02/03/23, Seble MALCOLM RN, scribed the services personally performed by Dr. Holden.    During this visit I, Love [facility]BROSIO [facility], obtained the history, performed the exam and developed the impression and plan.   I confirmed with the patient the review of systems, past history, family history, social history and medication history that were originally obtained by the nurse/scribe.   All of this has been  abdominal pain recorded here as a scribed note by the nurse/scribe who performed the duties of a scribe for this encounter in my presence.

## 2025-04-06 ENCOUNTER — INPATIENT (INPATIENT)
Facility: HOSPITAL | Age: 75
LOS: 1 days | Discharge: HOME | End: 2025-04-08
Attending: STUDENT IN AN ORGANIZED HEALTH CARE EDUCATION/TRAINING PROGRAM | Admitting: STUDENT IN AN ORGANIZED HEALTH CARE EDUCATION/TRAINING PROGRAM
Payer: MEDICARE

## 2025-04-06 VITALS
DIASTOLIC BLOOD PRESSURE: 88 MMHG | RESPIRATION RATE: 17 BRPM | TEMPERATURE: 98 F | SYSTOLIC BLOOD PRESSURE: 135 MMHG | HEART RATE: 85 BPM | WEIGHT: 125 LBS | OXYGEN SATURATION: 100 %

## 2025-04-06 LAB
APTT BLD: 27.2 SEC — SIGNIFICANT CHANGE UP (ref 24.5–35.6)
BASOPHILS # BLD AUTO: 0.05 K/UL — SIGNIFICANT CHANGE UP (ref 0–0.2)
BASOPHILS NFR BLD AUTO: 0.5 % — SIGNIFICANT CHANGE UP (ref 0–2)
EOSINOPHIL # BLD AUTO: 0.06 K/UL — SIGNIFICANT CHANGE UP (ref 0–0.5)
EOSINOPHIL NFR BLD AUTO: 0.6 % — SIGNIFICANT CHANGE UP (ref 0–6)
HCT VFR BLD CALC: 35.4 % — SIGNIFICANT CHANGE UP (ref 34.5–45)
HGB BLD-MCNC: 12 G/DL — SIGNIFICANT CHANGE UP (ref 11.5–15.5)
IMM GRANULOCYTES NFR BLD AUTO: 0.6 % — SIGNIFICANT CHANGE UP (ref 0–0.9)
INR BLD: 0.93 RATIO — SIGNIFICANT CHANGE UP (ref 0.85–1.16)
LYMPHOCYTES # BLD AUTO: 3.4 K/UL — HIGH (ref 1–3.3)
LYMPHOCYTES # BLD AUTO: 33.3 % — SIGNIFICANT CHANGE UP (ref 13–44)
MCHC RBC-ENTMCNC: 33.5 PG — SIGNIFICANT CHANGE UP (ref 27–34)
MCHC RBC-ENTMCNC: 33.9 G/DL — SIGNIFICANT CHANGE UP (ref 32–36)
MCV RBC AUTO: 98.9 FL — SIGNIFICANT CHANGE UP (ref 80–100)
MONOCYTES # BLD AUTO: 1.47 K/UL — HIGH (ref 0–0.9)
MONOCYTES NFR BLD AUTO: 14.4 % — HIGH (ref 2–14)
NEUTROPHILS # BLD AUTO: 5.18 K/UL — SIGNIFICANT CHANGE UP (ref 1.8–7.4)
NEUTROPHILS NFR BLD AUTO: 50.6 % — SIGNIFICANT CHANGE UP (ref 43–77)
NRBC BLD AUTO-RTO: 0 /100 WBCS — SIGNIFICANT CHANGE UP (ref 0–0)
PLATELET # BLD AUTO: 217 K/UL — SIGNIFICANT CHANGE UP (ref 150–400)
PROTHROM AB SERPL-ACNC: 10.8 SEC — SIGNIFICANT CHANGE UP (ref 9.9–13.4)
RBC # BLD: 3.58 M/UL — LOW (ref 3.8–5.2)
RBC # FLD: 12.8 % — SIGNIFICANT CHANGE UP (ref 10.3–14.5)
WBC # BLD: 10.22 K/UL — SIGNIFICANT CHANGE UP (ref 3.8–10.5)
WBC # FLD AUTO: 10.22 K/UL — SIGNIFICANT CHANGE UP (ref 3.8–10.5)

## 2025-04-06 PROCEDURE — 99285 EMERGENCY DEPT VISIT HI MDM: CPT

## 2025-04-06 RX ADMIN — Medication 1000 MILLILITER(S): at 23:35

## 2025-04-06 NOTE — ED ADULT NURSE NOTE - ED STAT RN HANDOFF DETAILS
Report endorsed to Luna Ruff RN. Safety checks completed this shift. Safety rounds completed hourly.  IV site intact. Medications administered as ordered with no signs/symptoms of adverse reactions. Fall and skin precautions in place. Any issues endorsed to RN for follow up. =

## 2025-04-06 NOTE — ED ADULT NURSE NOTE - OBJECTIVE STATEMENT
Pt is a 74y F AOX4 with a pmh of IBS, diverticulitis, GI bleed and colon resection. Pt reports 6 episodes of bright red stool in 2 hours with abdominal pain. Pt states she had diarrhea today and after she passes a bowel movement she feels lightheaded. Pt denies n/v, sob,cp, dizziness or headache.

## 2025-04-06 NOTE — ED ADULT TRIAGE NOTE - ACCOMPANIED BY
Render Note In Bullet Format When Appropriate: No Duration Of Freeze Thaw-Cycle (Seconds): 0 Show Aperture Variable?: Yes Consent: The patient's consent was obtained including but not limited to risks of crusting, scabbing, blistering, scarring, darker or lighter pigmentary change, recurrence, incomplete removal and infection. Post-Care Instructions: I reviewed with the patient in detail post-care instructions. Patient is to wear sunprotection, and avoid picking at any of the treated lesions. Pt may apply Vaseline to crusted or scabbing areas. Detail Level: Detailed EMT/paramedic

## 2025-04-07 DIAGNOSIS — K92.2 GASTROINTESTINAL HEMORRHAGE, UNSPECIFIED: ICD-10-CM

## 2025-04-07 LAB
ALBUMIN SERPL ELPH-MCNC: 3.3 G/DL — SIGNIFICANT CHANGE UP (ref 3.3–5)
ALP SERPL-CCNC: 67 U/L — SIGNIFICANT CHANGE UP (ref 40–120)
ALT FLD-CCNC: 16 U/L — SIGNIFICANT CHANGE UP (ref 12–78)
ANION GAP SERPL CALC-SCNC: 4 MMOL/L — LOW (ref 5–17)
ANION GAP SERPL CALC-SCNC: 5 MMOL/L — SIGNIFICANT CHANGE UP (ref 5–17)
AST SERPL-CCNC: 11 U/L — LOW (ref 15–37)
BILIRUB SERPL-MCNC: 0.3 MG/DL — SIGNIFICANT CHANGE UP (ref 0.2–1.2)
BLD GP AB SCN SERPL QL: SIGNIFICANT CHANGE UP
BUN SERPL-MCNC: 10 MG/DL — SIGNIFICANT CHANGE UP (ref 7–23)
BUN SERPL-MCNC: 18 MG/DL — SIGNIFICANT CHANGE UP (ref 7–23)
CALCIUM SERPL-MCNC: 8.6 MG/DL — SIGNIFICANT CHANGE UP (ref 8.5–10.1)
CALCIUM SERPL-MCNC: 9.5 MG/DL — SIGNIFICANT CHANGE UP (ref 8.5–10.1)
CHLORIDE SERPL-SCNC: 106 MMOL/L — SIGNIFICANT CHANGE UP (ref 96–108)
CHLORIDE SERPL-SCNC: 113 MMOL/L — HIGH (ref 96–108)
CO2 SERPL-SCNC: 26 MMOL/L — SIGNIFICANT CHANGE UP (ref 22–31)
CO2 SERPL-SCNC: 31 MMOL/L — SIGNIFICANT CHANGE UP (ref 22–31)
CREAT SERPL-MCNC: 0.51 MG/DL — SIGNIFICANT CHANGE UP (ref 0.5–1.3)
CREAT SERPL-MCNC: 0.68 MG/DL — SIGNIFICANT CHANGE UP (ref 0.5–1.3)
EGFR: 91 ML/MIN/1.73M2 — SIGNIFICANT CHANGE UP
EGFR: 91 ML/MIN/1.73M2 — SIGNIFICANT CHANGE UP
EGFR: 97 ML/MIN/1.73M2 — SIGNIFICANT CHANGE UP
EGFR: 97 ML/MIN/1.73M2 — SIGNIFICANT CHANGE UP
GLUCOSE SERPL-MCNC: 108 MG/DL — HIGH (ref 70–99)
GLUCOSE SERPL-MCNC: 126 MG/DL — HIGH (ref 70–99)
HCT VFR BLD CALC: 31.1 % — LOW (ref 34.5–45)
HGB BLD-MCNC: 10.8 G/DL — LOW (ref 11.5–15.5)
LACTATE SERPL-SCNC: 2 MMOL/L — SIGNIFICANT CHANGE UP (ref 0.7–2)
LIDOCAIN IGE QN: 31 U/L — SIGNIFICANT CHANGE UP (ref 13–75)
MCHC RBC-ENTMCNC: 33.9 PG — SIGNIFICANT CHANGE UP (ref 27–34)
MCHC RBC-ENTMCNC: 34.7 G/DL — SIGNIFICANT CHANGE UP (ref 32–36)
MCV RBC AUTO: 97.5 FL — SIGNIFICANT CHANGE UP (ref 80–100)
NRBC BLD AUTO-RTO: 0 /100 WBCS — SIGNIFICANT CHANGE UP (ref 0–0)
PLATELET # BLD AUTO: 198 K/UL — SIGNIFICANT CHANGE UP (ref 150–400)
POTASSIUM SERPL-MCNC: 4.4 MMOL/L — SIGNIFICANT CHANGE UP (ref 3.5–5.3)
POTASSIUM SERPL-MCNC: 4.5 MMOL/L — SIGNIFICANT CHANGE UP (ref 3.5–5.3)
POTASSIUM SERPL-SCNC: 4.4 MMOL/L — SIGNIFICANT CHANGE UP (ref 3.5–5.3)
POTASSIUM SERPL-SCNC: 4.5 MMOL/L — SIGNIFICANT CHANGE UP (ref 3.5–5.3)
PROT SERPL-MCNC: 7 GM/DL — SIGNIFICANT CHANGE UP (ref 6–8.3)
RBC # BLD: 3.19 M/UL — LOW (ref 3.8–5.2)
RBC # FLD: 12.7 % — SIGNIFICANT CHANGE UP (ref 10.3–14.5)
SODIUM SERPL-SCNC: 141 MMOL/L — SIGNIFICANT CHANGE UP (ref 135–145)
SODIUM SERPL-SCNC: 144 MMOL/L — SIGNIFICANT CHANGE UP (ref 135–145)
WBC # BLD: 9.83 K/UL — SIGNIFICANT CHANGE UP (ref 3.8–10.5)
WBC # FLD AUTO: 9.83 K/UL — SIGNIFICANT CHANGE UP (ref 3.8–10.5)

## 2025-04-07 PROCEDURE — 74174 CTA ABD&PLVS W/CONTRAST: CPT | Mod: 26

## 2025-04-07 PROCEDURE — 99222 1ST HOSP IP/OBS MODERATE 55: CPT

## 2025-04-07 PROCEDURE — 99232 SBSQ HOSP IP/OBS MODERATE 35: CPT

## 2025-04-07 RX ORDER — MAGNESIUM, ALUMINUM HYDROXIDE 200-200 MG
30 TABLET,CHEWABLE ORAL EVERY 6 HOURS
Refills: 0 | Status: DISCONTINUED | OUTPATIENT
Start: 2025-04-07 | End: 2025-04-08

## 2025-04-07 RX ORDER — LORAZEPAM 4 MG/ML
0.5 VIAL (ML) INJECTION AT BEDTIME
Refills: 0 | Status: DISCONTINUED | OUTPATIENT
Start: 2025-04-07 | End: 2025-04-08

## 2025-04-07 RX ORDER — CYCLOBENZAPRINE HYDROCHLORIDE 15 MG/1
5 CAPSULE, EXTENDED RELEASE ORAL THREE TIMES A DAY
Refills: 0 | Status: DISCONTINUED | OUTPATIENT
Start: 2025-04-07 | End: 2025-04-08

## 2025-04-07 RX ORDER — ATORVASTATIN CALCIUM 80 MG/1
20 TABLET, FILM COATED ORAL AT BEDTIME
Refills: 0 | Status: DISCONTINUED | OUTPATIENT
Start: 2025-04-07 | End: 2025-04-08

## 2025-04-07 RX ORDER — FERROUS SULFATE 137(45) MG
325 TABLET, EXTENDED RELEASE ORAL DAILY
Refills: 0 | Status: DISCONTINUED | OUTPATIENT
Start: 2025-04-07 | End: 2025-04-08

## 2025-04-07 RX ORDER — ACETAMINOPHEN 500 MG/5ML
1000 LIQUID (ML) ORAL ONCE
Refills: 0 | Status: COMPLETED | OUTPATIENT
Start: 2025-04-07 | End: 2025-04-07

## 2025-04-07 RX ADMIN — Medication 100 MILLILITER(S): at 05:11

## 2025-04-07 RX ADMIN — Medication 100 MILLILITER(S): at 15:46

## 2025-04-07 RX ADMIN — Medication 100 MILLILITER(S): at 21:12

## 2025-04-07 RX ADMIN — ATORVASTATIN CALCIUM 20 MILLIGRAM(S): 80 TABLET, FILM COATED ORAL at 21:12

## 2025-04-07 RX ADMIN — Medication 1000 UNIT(S): at 12:05

## 2025-04-07 RX ADMIN — Medication 325 MILLIGRAM(S): at 12:05

## 2025-04-07 RX ADMIN — Medication 400 MILLIGRAM(S): at 15:27

## 2025-04-07 RX ADMIN — Medication 0.5 MILLIGRAM(S): at 21:12

## 2025-04-07 RX ADMIN — Medication 1000 MILLIGRAM(S): at 16:25

## 2025-04-07 RX ADMIN — Medication 40 MILLIGRAM(S): at 05:11

## 2025-04-07 RX ADMIN — Medication 40 MILLIGRAM(S): at 17:12

## 2025-04-07 NOTE — PATIENT PROFILE ADULT - NSPROHMSYMPCOND_GEN_A_NUR
[facility]: [facility]  PROGRESS NOTE      Sebastien Bahena is a 64 y.o. male that is presenting today for Follow-up.    Assessment/Plan   Diagnoses and all orders for this visit:  Primary hypertension  -     hydroCHLOROthiazide (HYDRODiuril) 25 mg tablet; Take 1 tablet (25 mg) by mouth once daily.  Mixed hyperlipidemia  -     lisinopril 10 mg tablet; Take 1 tablet (10 mg) by mouth once daily.  -     rosuvastatin (Crestor) 40 mg tablet; Take 1 tablet (40 mg) by mouth once daily.  -     TSH with reflex to Free T4 if abnormal; Future  -     Lipid Panel; Future  Pre-diabetes  -     Hemoglobin A1C; Future  -     TSH with reflex to Free T4 if abnormal; Future  -     Comprehensive Metabolic Panel; Future  Elevated PSA measurement  -     Prostate Specific Antigen; Future  Vitamin D deficiency  -     Vitamin D 25-Hydroxy,Total (for eval of Vitamin D levels); Future  Encounter for prostate cancer screening  -     Prostate Specific Antigen; Future  History of anemia  -     CBC and Auto Differential; Future  Other orders  -     Follow Up In Primary Care  -     Follow Up In Primary Care; Future  Subjective   HPI    - Sebastien Bahena 64 y.o. male is here today for his 6 months FUV appt. for HTN and HLD, BW results and refills .       - Patient denies any symptoms or concerns at this time.       - patient denies any adverse reactions to or concerns with his/her meds.       - Problem list and medication reconciliation done today.  - V.S. Stable. No changes at this time.  - Encouraged continued diet and exercise modification.     Review of Systems   All pertinent POSITIVES as noted per HPI.  All other systems have been reviewed and are NEGATIVE and /or Noncontributory to this patient current visit or complaint.     Objective   There were no vitals filed for this visit.   There is no height or weight on file to calculate BMI.  Physical Exam  Vitals and nursing note reviewed.   Constitutional:       Appearance:  "Normal appearance.   HENT:      Head: Normocephalic and atraumatic.   Neck:      Vascular: No carotid bruit.   Cardiovascular:      Rate and Rhythm: Normal rate and regular rhythm.      Pulses: Normal pulses.      Heart sounds: Normal heart sounds.   Pulmonary:      Effort: Pulmonary effort is normal.      Breath sounds: Normal breath sounds.   Abdominal:      General: Abdomen is flat. Bowel sounds are normal.      Palpations: Abdomen is soft.      Tenderness: There is no abdominal tenderness.   Musculoskeletal:         General: No swelling. Normal range of motion.      Cervical back: Neck supple.   Lymphadenopathy:      Cervical: No cervical adenopathy.   Skin:     General: Skin is warm and dry.   Neurological:      Mental Status: He is alert.   Psychiatric:         Mood and Affect: Mood normal.       Diagnostic Results   Lab Results   Component Value Date    GLUCOSE 98 12/17/2024    CALCIUM 9.5 12/17/2024     12/17/2024    K 4.3 12/17/2024    CO2 27 12/17/2024     12/17/2024    BUN 17 12/17/2024    CREATININE 0.75 12/17/2024     Lab Results   Component Value Date    ALT 34 06/11/2024    AST 25 06/11/2024    ALKPHOS 67 06/11/2024    BILITOT 0.5 06/11/2024     Lab Results   Component Value Date    WBC 7.4 06/11/2024    HGB 14.8 06/11/2024    HCT 45.7 06/11/2024    MCV 94 06/11/2024     06/11/2024     Lab Results   Component Value Date    CHOL 117 06/11/2024    CHOL 125 02/14/2023    CHOL 121 07/19/2022     Lab Results   Component Value Date    HDL 36.9 06/11/2024    HDL 37.7 (A) 02/14/2023    HDL 37.7 (A) 07/19/2022     Lab Results   Component Value Date    LDLCALC 47 06/11/2024     Lab Results   Component Value Date    TRIG 166 (H) 06/11/2024    TRIG 157 (H) 02/14/2023    TRIG 190 (H) 07/19/2022     No components found for: \"CHOLHDL\"  Lab Results   Component Value Date    HGBA1C 5.9 (H) 12/17/2024     Other labs not included in the list above were reviewed either before or during this " encounter.    History    Past Medical History:   Diagnosis Date    Acute otitis externa 04/04/2024    Agatston CAC score, <100 04/04/2024    Body mass index (BMI) 23.0-23.9, adult 02/25/2021    BMI 23.0-23.9, adult    Body mass index (BMI) 23.0-23.9, adult 10/05/2021    BMI 23.0-23.9, adult    Body mass index (BMI) 23.0-23.9, adult 07/19/2022    BMI 23.0-23.9, adult    Body mass index (BMI) 24.0-24.9, adult     BMI 24.0-24.9, adult    Contact with and (suspected) exposure to covid-19 02/25/2021    Suspected severe acute respiratory syndrome coronavirus 2 (SARS-CoV-2) infection    Encounter for immunization 01/16/2020    Influenza vaccination administered at current visit    Immunization not carried out because of patient refusal 02/15/2016    Pneumococcal vaccine refused    Immunization not carried out because of patient refusal 08/25/2014    Influenza vaccine refused    Immunization not carried out because of patient refusal 02/15/2016    Influenza vaccine refused    Impacted cerumen, right ear 01/05/2015    Impacted cerumen of right ear    Influenza due to unidentified influenza virus with other respiratory manifestations 01/16/2020    Influenza-like syndrome    Laceration of extensor muscle, fascia and tendon of left thumb at wrist and hand level, initial encounter 08/23/2021    Laceration of extensor muscle, fascia, and tendon of left thumb at wrist and hand level    Personal history of colonic polyps 02/16/2016    Personal history of colonic polyps    Personal history of other diseases of the respiratory system 10/05/2021    History of sinusitis    Personal history of other endocrine, nutritional and metabolic disease 03/11/2019    History of hyperglycemia    Personal history of other specified conditions 10/05/2021    History of syncope    Personal history of urinary (tract) infections 03/11/2019    History of urinary tract infection    Procedure and treatment not carried out because of patient's decision for  unspecified reasons 02/15/2016    Colonoscopy refused    Unspecified otitis externa, unspecified ear 01/08/2015    Otitis externa     Past Surgical History:   Procedure Laterality Date    HERNIA REPAIR  12/16/2013    Hernia Repair     No family history on file.  Social History     Socioeconomic History    Marital status:      Spouse name: Not on file    Number of children: Not on file    Years of education: Not on file    Highest education level: Not on file   Occupational History    Not on file   Tobacco Use    Smoking status: Never     Passive exposure: Never    Smokeless tobacco: Never   Vaping Use    Vaping status: Never Used   Substance and Sexual Activity    Alcohol use: Yes    Drug use: Yes     Types: Marijuana    Sexual activity: Not on file   Other Topics Concern    Not on file   Social History Narrative    Not on file     Social Drivers of Health     Financial Resource Strain: Not on file   Food Insecurity: Not on file   Transportation Needs: Not on file   Physical Activity: Not on file   Stress: Not on file   Social Connections: Not on file   Intimate Partner Violence: Not on file   Housing Stability: Not on file     No Known Allergies  Current Outpatient Medications on File Prior to Visit   Medication Sig Dispense Refill    cholecalciferol (Vitamin D3) 50 mcg (2,000 unit) capsule Take 1 capsule (50 mcg) by mouth once daily. Start only after finishing the freddy dose 30 capsule 11    hydroCHLOROthiazide (HYDRODiuril) 25 mg tablet Take 1 tablet (25 mg) by mouth once daily. 90 tablet 1    lisinopril 10 mg tablet Take 1 tablet (10 mg) by mouth once daily. 90 tablet 3    rosuvastatin (Crestor) 40 mg tablet Take 1 tablet (40 mg) by mouth once daily. 90 tablet 1     No current facility-administered medications on file prior to visit.     Immunization History   Administered Date(s) Administered    COVID-19, mRNA, LNP-S, PF, 30 mcg/0.3 mL dose 03/30/2021, 04/22/2021    Flu vaccine (IIV4), preservative free  *Check age/dose* 10/05/2021    Influenza, injectable, quadrivalent 01/16/2020, 07/19/2022    Influenza, seasonal, injectable 10/05/2021, 09/29/2022    Moderna SARS-CoV-2 Vaccination 12/08/2021, 06/07/2022    Pneumococcal conjugate vaccine, 20-valent (PREVNAR 20) 06/25/2024    Pneumococcal polysaccharide vaccine, 23-valent, age 2 years and older (PNEUMOVAX 23) 02/25/2021    Tdap vaccine, age 7 year and older (BOOSTRIX, ADACEL) 01/05/2011, 02/06/2024    Zoster vaccine, recombinant, adult (SHINGRIX) 07/19/2022    Zoster, Unspecified 07/19/2022     Patient's medical history was reviewed and updated either before or during this encounter.       Kurtis Mendiola MD   none

## 2025-04-07 NOTE — ED PROVIDER NOTE - CONSIDERATION OF ADMISSION OBSERVATION
Consideration of Admission/Observation will admit for GI evaluation. hx of colon resection 2/2 diverticulitis

## 2025-04-07 NOTE — H&P ADULT - ASSESSMENT
74-year-old female with past medical history of diverticulitis s/p colon resection in 2009, pernicious anemia, iron deficiency anemia, osteoporosis, fibromyalgia, OA, presents today for rectal bleeding, admitted for lower GI bleed    #Lower GI bleed  Patient states she has had 6 episodes of bloody stools. Hgb stable at 12. CT abdomen/pelvis shows no CT evidence of acute active GI bleeding at the time scanning; suspected angiodysplasia of the small bowel; and gastritis  - NPO  - IVF hydration   - PPI BID  - GI consult in the AM   - Patient with no active bleeding at this time so holding off on frequent CBC    Chronic medical conditions:  Anxiety: Continue lorazepam HS  HLD: Continue simvastatin (atorvastatin as therapeutic interchange)  LAN: Continue ferrous sulfate    Holding DVT PPx in the setting of GI bleed  Medication list acquired from patient.

## 2025-04-07 NOTE — ED PROVIDER NOTE - CLINICAL SUMMARY MEDICAL DECISION MAKING FREE TEXT BOX
74 F presenting to the ED for bloody bowel movement x 2    hx of diverticular bleed, colon resection  labs, T&S  CTA abd/pelvis

## 2025-04-07 NOTE — CONSULT NOTE ADULT - SUBJECTIVE AND OBJECTIVE BOX
Full consult dictated    Plan:  75 yo F with h/o diverticulitis s/p colon resection in 2009, pernicious anemia, iron deficiency anemia, osteoporosis, fibromyalgia, OA, admitted after several episodes of BRBPR. Pt likely with recurrent diverticular bleed. CT angio neg for active bleed; +suspected angiodysplasia of the small bowel; and gastritis. No further bleeding today. Will start clear liquids diet and repeat CBC in AM.

## 2025-04-07 NOTE — H&P ADULT - NSHPLABSRESULTS_GEN_ALL_CORE
.  LABS:                         12.0   10.22 )-----------( 217      ( 06 Apr 2025 23:30 )             35.4     04-06    141  |  106  |  18  ----------------------------<  126[H]  4.5   |  31  |  0.68    Ca    9.5      06 Apr 2025 23:30    TPro  7.0  /  Alb  3.3  /  TBili  0.3  /  DBili  x   /  AST  11[L]  /  ALT  16  /  AlkPhos  67  04-06    PT/INR - ( 06 Apr 2025 23:30 )   PT: 10.8 sec;   INR: 0.93 ratio         PTT - ( 06 Apr 2025 23:30 )  PTT:27.2 sec  Urinalysis Basic - ( 06 Apr 2025 23:30 )    Color: x / Appearance: x / SG: x / pH: x  Gluc: 126 mg/dL / Ketone: x  / Bili: x / Urobili: x   Blood: x / Protein: x / Nitrite: x   Leuk Esterase: x / RBC: x / WBC x   Sq Epi: x / Non Sq Epi: x / Bacteria: x        Lactate, Blood: 2.0 mmol/L (04-06 @ 23:30)      RADIOLOGY, EKG & ADDITIONAL TESTS: Reviewed.

## 2025-04-07 NOTE — ED PROVIDER NOTE - NS ED ROS FT
General: Denies fever, chills  HEENT: Denies sensory changes, sore throat  Neck: Denies neck pain, neck stiffness  Resp: Denies coughing, SOB  Cardiovascular: Denies CP, palpitations, LE edema  GI: + abd pain, blood in stool  : Denies dysuria, hematuria, frequency, incontinence  MSK: Denies back pain  Neuro: Denies HA, dizziness, numbness, weakness  Skin: Denies rashes.

## 2025-04-07 NOTE — ED PROVIDER NOTE - NSICDXPASTMEDICALHX_GEN_ALL_CORE_FT
PAST MEDICAL HISTORY:  Age Related Osteoporosis     Diverticulitis     GERD (Gastroesophageal Reflux     IBS (Irritable Bowel Syndrome)

## 2025-04-07 NOTE — H&P ADULT - HISTORY OF PRESENT ILLNESS
74-year-old female with past medical history of diverticulitis/p: Resection in 2009, pernicious anemia, iron deficiency anemia, osteoporosis, fibromyalgia, OA, presents today for rectal bleeding. The patient states that she has been experiencing diarrhea all week, however, last night between 9 and 10:30 pm she experienced about six episodes of bloody stools. The patient states that she has been experiencing lightheadedness, nausea, and abdominal pain, however, her abdominal pain is chronic. She denies vomiting, fever, chills, shortness of breath. Of note, the patient has suffered a colon bleed in 2008 where she required ICU admission and required 13 blood transfusions. Colonoscopy was done at the time and she mentioned that a spot was found in her colon which was clamped to stop the bleeding. The patient also states that in the past, she has had a history of difficult colonoscopies due to her anatomy with difficulty entering the colon to complete her colonoscopy.     ED course: The patient is afebrile and hemodynamically stable. Labs significant for stable hemoglobin of 12. CT scan abdomen/pelvis shows  74-year-old female with past medical history of diverticulitis s/p colon resection in 2009, pernicious anemia, iron deficiency anemia, osteoporosis, fibromyalgia, OA, presents today for rectal bleeding. The patient states that she has been experiencing diarrhea all week, however, last night between 9 and 10:30 pm she experienced about six episodes of bloody stools. The patient states that she has been experiencing lightheadedness, nausea, and abdominal pain, however, her abdominal pain is chronic. She denies vomiting, fever, chills, shortness of breath.     Of note, the patient mentions she suffered a colon bleed in 2008 which required ICU admission and 13 blood transfusions. Colonoscopy was done at the time and she mentioned that a "spot" was found in her colon which was clamped to stop the bleeding. The patient also states that in the past, she has had a history of difficult colonoscopies due to her anatomy with difficulty of being able to advance the scope during the procedure.    ED course: The patient is afebrile and hemodynamically stable. Labs significant for stable hemoglobin of 12. CT scan abdomen/pelvis shows no CT evidence of acute active GI bleeding at the time scanning; suspected angiodysplasia of the small bowel; and gastritis.

## 2025-04-07 NOTE — PROGRESS NOTE ADULT - SUBJECTIVE AND OBJECTIVE BOX
Patient is a 74y old  Female who presents with a chief complaint of Lower GI bleed (07 Apr 2025 15:36)      INTERVAL HPI/OVERNIGHT EVENTS: Overnight no acute events.     MEDICATIONS  (STANDING):  atorvastatin 20 milliGRAM(s) Oral at bedtime  cholecalciferol 1000 Unit(s) Oral daily  ferrous    sulfate 325 milliGRAM(s) Oral daily  LORazepam     Tablet 0.5 milliGRAM(s) Oral at bedtime  pantoprazole  Injectable 40 milliGRAM(s) IV Push every 12 hours  sodium chloride 0.9%. 1000 milliLiter(s) (100 mL/Hr) IV Continuous <Continuous>    MEDICATIONS  (PRN):  aluminum hydroxide/magnesium hydroxide/simethicone Suspension 30 milliLiter(s) Oral every 6 hours PRN Dyspepsia  cyclobenzaprine 5 milliGRAM(s) Oral three times a day PRN Muscle Spasm      Allergies    Bactrim (Hives)  trimethoprim (Unknown)  sulfa drugs (Unknown)  sulfadoxine (Hives)  dust (Hives)    Intolerances        REVIEW OF SYSTEMS:  ROS negative unless stated otherwise.    Vital Signs Last 24 Hrs  T(C): 37.1 (07 Apr 2025 14:16), Max: 37.4 (07 Apr 2025 10:21)  T(F): 98.8 (07 Apr 2025 14:16), Max: 99.3 (07 Apr 2025 10:21)  HR: 98 (07 Apr 2025 14:16) (85 - 118)  BP: 143/94 (07 Apr 2025 14:16) (129/84 - 143/94)  BP(mean): --  RR: 18 (07 Apr 2025 14:16) (17 - 18)  SpO2: 97% (07 Apr 2025 14:16) (96% - 100%)    Parameters below as of 07 Apr 2025 14:16  Patient On (Oxygen Delivery Method): room air        PHYSICAL EXAM:    GENERAL: NAD, lying in bed comfortably  HEAD:  Atraumatic, normocephalic  EYES: EOMI, PERRL  NECK: Supple, trachea midline, no JVD  HEART: Regular rate and rhythm  LUNGS: Unlabored respirations.   ABDOMEN: Soft, nontender, nondistended, +BS  EXTREMITIES: 2+ peripheral pulses bilaterally.   NERVOUS SYSTEM:  A&O    LABS:                        10.8   9.83  )-----------( 198      ( 07 Apr 2025 14:30 )             31.1     04-06    141  |  106  |  18  ----------------------------<  126[H]  4.5   |  31  |  0.68    Ca    9.5      06 Apr 2025 23:30    TPro  7.0  /  Alb  3.3  /  TBili  0.3  /  DBili  x   /  AST  11[L]  /  ALT  16  /  AlkPhos  67  04-06    PT/INR - ( 06 Apr 2025 23:30 )   PT: 10.8 sec;   INR: 0.93 ratio         PTT - ( 06 Apr 2025 23:30 )  PTT:27.2 sec  Urinalysis Basic - ( 06 Apr 2025 23:30 )    Color: x / Appearance: x / SG: x / pH: x  Gluc: 126 mg/dL / Ketone: x  / Bili: x / Urobili: x   Blood: x / Protein: x / Nitrite: x   Leuk Esterase: x / RBC: x / WBC x   Sq Epi: x / Non Sq Epi: x / Bacteria: x      CAPILLARY BLOOD GLUCOSE          RADIOLOGY & ADDITIONAL TESTS:    Imaging Personally Reviewed:  [ X] YES  [ ] NO    Consultant(s) Notes Reviewed:  [ X] YES  [ ] NO    Care Discussed with Consultants/Other Providers [X ] YES  [ ] NO

## 2025-04-07 NOTE — PROGRESS NOTE ADULT - ASSESSMENT
74-year-old female with past medical history of diverticulitis s/p colon resection in 2009, pernicious anemia, iron deficiency anemia, osteoporosis, fibromyalgia, OA, presents today for rectal bleeding, admitted for lower GI bleed    #Lower GI bleed  Patient states she has had 6 episodes of bloody stools. Hgb stable at 12. CT abdomen/pelvis shows no CT evidence of acute active GI bleeding at the time scanning; suspected angiodysplasia of the small bowel; and gastritis  - NPO-- advance to clears   - IVF hydration   - PPI BID  - GI consulted   - f/u cbc    Chronic medical conditions:  Anxiety: Continue lorazepam HS  HLD: Continue simvastatin (atorvastatin as therapeutic interchange)  LAN: Continue ferrous sulfate    Holding DVT PPx in the setting of GI bleed  Medication list acquired from patient.

## 2025-04-07 NOTE — H&P ADULT - NSHPPHYSICALEXAM_GEN_ALL_CORE
Vital Signs Last 24 Hrs  T(C): 36.6 (07 Apr 2025 04:29), Max: 36.8 (07 Apr 2025 03:58)  T(F): 97.9 (07 Apr 2025 04:29), Max: 98.3 (07 Apr 2025 03:58)  HR: 100 (07 Apr 2025 04:29) (85 - 100)  BP: 136/90 (07 Apr 2025 04:29) (135/88 - 139/88)  BP(mean): --  RR: 18 (07 Apr 2025 04:29) (17 - 18)  SpO2: 99% (07 Apr 2025 04:29) (97% - 100%)    Parameters below as of 07 Apr 2025 03:58  Patient On (Oxygen Delivery Method): room air    GENERAL: NAD, lying in bed comfortably  HEAD:  Atraumatic, normocephalic  EYES: EOMI, PERRL  NECK: Supple, trachea midline, no JVD  HEART: Regular rate and rhythm  LUNGS: Unlabored respirations.  Clear to auscultation bilaterally, no crackles, wheezing, or rhonchi  ABDOMEN: Soft, nontender, nondistended, +BS  EXTREMITIES: 2+ peripheral pulses bilaterally. No clubbing, cyanosis, or edema  NERVOUS SYSTEM:  A&Ox3, moving all extremities, no focal deficits

## 2025-04-07 NOTE — ED PROVIDER NOTE - NSICDXPASTSURGICALHX_GEN_ALL_CORE_FT
PAST SURGICAL HISTORY:  Status Post Appendectomy     Status Post Laparoscopic-Margie     Status Post Tonsillectomy

## 2025-04-07 NOTE — ED PROVIDER NOTE - OBJECTIVE STATEMENT
74 F pmh diverticulitis, colon resection, diverticular bleed presenting to the ED for bloody stools. Pt states that she had 2 large bouts of blood stool this evening prior to coming to the ED. In total patient had > 6 episodes over 2 hours prior to coming to the ED. Pt denies abd pain, nausea, vomiting, back pain

## 2025-04-07 NOTE — PATIENT PROFILE ADULT - FALL HARM RISK - HARM RISK INTERVENTIONS

## 2025-04-07 NOTE — ED PROVIDER NOTE - PHYSICAL EXAMINATION
General: Well appearing elderly female in no acute distress  HEENT: Normocephalic, atraumatic. Moist mucous membranes. Oropharynx clear. No lymphadenopathy.  Eyes: No scleral icterus. EOMI. EDUARDO.  Neck:. Soft and supple. Full ROM without pain. No midline tenderness  Cardiac: Regular rate and regular rhythm. No murmurs, rubs, gallops. Peripheral pulses 2+ and symmetric. No LE edema.  Resp: Lungs CTAB. Speaking in full sentences. No wheezes, rales or rhonchi.  Abd: Soft, diffuse abd tenderness. No guarding or rebound.  : + external hemorrhoids, gross blood on rectal exam  Back: Spine midline and non-tender. No CVA tenderness.    Skin: No rashes, abrasions, or lacerations.  Neuro: AO x 3. Moves all extremities symmetrically. Motor strength and sensation grossly intact. ambulatory

## 2025-04-08 ENCOUNTER — TRANSCRIPTION ENCOUNTER (OUTPATIENT)
Age: 75
End: 2025-04-08

## 2025-04-08 VITALS
HEART RATE: 118 BPM | SYSTOLIC BLOOD PRESSURE: 149 MMHG | RESPIRATION RATE: 18 BRPM | OXYGEN SATURATION: 98 % | TEMPERATURE: 98 F | DIASTOLIC BLOOD PRESSURE: 86 MMHG

## 2025-04-08 LAB
ANION GAP SERPL CALC-SCNC: 4 MMOL/L — LOW (ref 5–17)
BUN SERPL-MCNC: 7 MG/DL — SIGNIFICANT CHANGE UP (ref 7–23)
CALCIUM SERPL-MCNC: 8.9 MG/DL — SIGNIFICANT CHANGE UP (ref 8.5–10.1)
CHLORIDE SERPL-SCNC: 113 MMOL/L — HIGH (ref 96–108)
CO2 SERPL-SCNC: 26 MMOL/L — SIGNIFICANT CHANGE UP (ref 22–31)
CREAT SERPL-MCNC: 0.54 MG/DL — SIGNIFICANT CHANGE UP (ref 0.5–1.3)
EGFR: 96 ML/MIN/1.73M2 — SIGNIFICANT CHANGE UP
EGFR: 96 ML/MIN/1.73M2 — SIGNIFICANT CHANGE UP
GLUCOSE SERPL-MCNC: 128 MG/DL — HIGH (ref 70–99)
HCT VFR BLD CALC: 30.4 % — LOW (ref 34.5–45)
HGB BLD-MCNC: 10.4 G/DL — LOW (ref 11.5–15.5)
MCHC RBC-ENTMCNC: 34 PG — SIGNIFICANT CHANGE UP (ref 27–34)
MCHC RBC-ENTMCNC: 34.2 G/DL — SIGNIFICANT CHANGE UP (ref 32–36)
MCV RBC AUTO: 99.3 FL — SIGNIFICANT CHANGE UP (ref 80–100)
NRBC BLD AUTO-RTO: 0 /100 WBCS — SIGNIFICANT CHANGE UP (ref 0–0)
NT-PROBNP SERPL-SCNC: 103 PG/ML — SIGNIFICANT CHANGE UP (ref 0–450)
PLATELET # BLD AUTO: 182 K/UL — SIGNIFICANT CHANGE UP (ref 150–400)
POTASSIUM SERPL-MCNC: 3.7 MMOL/L — SIGNIFICANT CHANGE UP (ref 3.5–5.3)
POTASSIUM SERPL-SCNC: 3.7 MMOL/L — SIGNIFICANT CHANGE UP (ref 3.5–5.3)
RBC # BLD: 3.06 M/UL — LOW (ref 3.8–5.2)
RBC # FLD: 13 % — SIGNIFICANT CHANGE UP (ref 10.3–14.5)
SODIUM SERPL-SCNC: 143 MMOL/L — SIGNIFICANT CHANGE UP (ref 135–145)
WBC # BLD: 8.88 K/UL — SIGNIFICANT CHANGE UP (ref 3.8–10.5)
WBC # FLD AUTO: 8.88 K/UL — SIGNIFICANT CHANGE UP (ref 3.8–10.5)

## 2025-04-08 PROCEDURE — 99239 HOSP IP/OBS DSCHRG MGMT >30: CPT

## 2025-04-08 RX ORDER — SUMATRIPTAN 100 MG/1
25 TABLET, FILM COATED ORAL ONCE
Refills: 0 | Status: COMPLETED | OUTPATIENT
Start: 2025-04-08 | End: 2025-04-08

## 2025-04-08 RX ORDER — OMEPRAZOLE 20 MG/1
0 CAPSULE, DELAYED RELEASE ORAL
Qty: 0 | Refills: 0 | DISCHARGE

## 2025-04-08 RX ORDER — CYCLOBENZAPRINE HYDROCHLORIDE 15 MG/1
0 CAPSULE, EXTENDED RELEASE ORAL
Qty: 0 | Refills: 0 | DISCHARGE

## 2025-04-08 RX ORDER — LORAZEPAM 4 MG/ML
1 VIAL (ML) INJECTION
Qty: 0 | Refills: 0 | DISCHARGE
Start: 2025-04-08

## 2025-04-08 RX ORDER — CYCLOBENZAPRINE HYDROCHLORIDE 15 MG/1
1 CAPSULE, EXTENDED RELEASE ORAL
Qty: 0 | Refills: 0 | DISCHARGE
Start: 2025-04-08

## 2025-04-08 RX ORDER — LORAZEPAM 4 MG/ML
0 VIAL (ML) INJECTION
Qty: 0 | Refills: 0 | DISCHARGE

## 2025-04-08 RX ADMIN — Medication 325 MILLIGRAM(S): at 11:30

## 2025-04-08 RX ADMIN — CYCLOBENZAPRINE HYDROCHLORIDE 5 MILLIGRAM(S): 15 CAPSULE, EXTENDED RELEASE ORAL at 01:13

## 2025-04-08 RX ADMIN — Medication 1000 UNIT(S): at 11:30

## 2025-04-08 RX ADMIN — SUMATRIPTAN 25 MILLIGRAM(S): 100 TABLET, FILM COATED ORAL at 12:02

## 2025-04-08 RX ADMIN — Medication 40 MILLIGRAM(S): at 05:26

## 2025-04-08 RX ADMIN — SUMATRIPTAN 25 MILLIGRAM(S): 100 TABLET, FILM COATED ORAL at 13:05

## 2025-04-08 NOTE — DISCHARGE NOTE PROVIDER - CARE PROVIDER_API CALL
Colby Kohler  Gastroenterology  [address], [address]  [place] [address]  Phone: [phone]  Fax: [phone]  Follow Up Time: 1 week

## 2025-04-08 NOTE — PHARMACOTHERAPY INTERVENTION NOTE - OUTCOME
accepted Include Z78.9 (Other Specified Conditions Influencing Health Status) As An Associated Diagnosis?: No

## 2025-04-08 NOTE — DISCHARGE NOTE NURSING/CASE MANAGEMENT/SOCIAL WORK - FINANCIAL ASSISTANCE
[facility] provides services at a reduced cost to those who are determined to be eligible through [facility]’s financial assistance program. Information regarding [facility]’s financial assistance program can be found by going to [email or web address] or by calling [phone].

## 2025-04-08 NOTE — DISCHARGE NOTE PROVIDER - NSDCMRMEDTOKEN_GEN_ALL_CORE_FT
cholecalciferol 25 mcg (1000 intl units) oral tablet: 1 tab(s) orally once a day  cyclobenzaprine 5 mg oral tablet: 1 tab(s) orally 3 times a day As needed Muscle Spasm  Feosol 325 mg (65 mg elemental iron) oral tablet: 1 tab(s) orally once a day  LORazepam 0.5 mg oral tablet: 1 tab(s) orally once a day (at bedtime)  pantoprazole 40 mg oral delayed release tablet: 1 tab(s) orally once a day (before a meal)  SIMVASTATIN 40 MG TABLET: 1 tab(s) orally once a day

## 2025-04-08 NOTE — DISCHARGE NOTE PROVIDER - NSDCFUADDAPPT_GEN_ALL_CORE_FT
APPTS ARE READY TO BE MADE: [X] YES    Best Family or Patient Contact (if needed):    Additional Information about above appointments (if needed):    1: GI  2:   3:     Other comments or requests:

## 2025-04-08 NOTE — DISCHARGE NOTE PROVIDER - HOSPITAL COURSE
74-year-old female with a past medical history of diverticulitis status post colon resection in 2009, pernicious anemia, iron deficiency anemia, osteoporosis, fibromyalgia, and osteoarthritis.    Hospital Course:  The patient presented with six episodes of bloody stools. Her hemoglobin was stable at 12 g/dL. A CT scan of the abdomen and pelvis showed no evidence of acute active GI bleeding at the time of scanning but suggested possible angiodysplasia of the small bowel and gastritis. She was made NPO and advanced to clear liquids once stable. IV fluid hydration and a proton pump inhibitor (PPI) twice daily were initiated. Gastroenterology was consulted, offered inpatient colonoscopy- however patient deferred to outpatient. She has her own GI doctor. Hgb 10.4 ( 10.8, 12) stable- likely small drop from hemodiluation as patient received significant fluids throughout admission.     Her chronic medical conditions were managed as follows:    Anxiety: Continued home lorazepam at bedtime  Hyperlipidemia: Continued home simvastatin (atorvastatin was used as a therapeutic interchange)  Iron deficiency anemia: Continued home ferrous sulfate  Deep vein thrombosis (DVT) prophylaxis was held in the setting of an active GI bleed. A comprehensive medication list was obtained from the patient.    Discharge Diagnoses:    Lower gastrointestinal bleed, likely recurrent diverticular bleed   Suspected angiodysplasia of the small bowel  Gastritis  Pernicious anemia  Iron deficiency anemia  Osteoporosis  Fibromyalgia  Osteoarthritis  Anxiety    Patient seen and evaluated. DC home.   DC time 37 mins

## 2025-04-08 NOTE — DISCHARGE NOTE NURSING/CASE MANAGEMENT/SOCIAL WORK - PATIENT PORTAL LINK FT
You can access the FollowMyHealth Patient Portal offered by [facility] by registering at the following website: [email or web address]. By joining [facility]’s FollowMyHealth portal, you will also be able to view your health information using other applications (apps) compatible with our system.

## 2025-04-08 NOTE — PROGRESS NOTE ADULT - SUBJECTIVE AND OBJECTIVE BOX
Pt feeling much better  no bleeding  Pt offered colonoscopy tomorrow --> pt does not want to stay --> she prefers outpatient procedure    MEDICATIONS  (STANDING):  atorvastatin 20 milliGRAM(s) Oral at bedtime  cholecalciferol 1000 Unit(s) Oral daily  ferrous    sulfate 325 milliGRAM(s) Oral daily  LORazepam     Tablet 0.5 milliGRAM(s) Oral at bedtime  pantoprazole  Injectable 40 milliGRAM(s) IV Push every 12 hours    MEDICATIONS  (PRN):  aluminum hydroxide/magnesium hydroxide/simethicone Suspension 30 milliLiter(s) Oral every 6 hours PRN Dyspepsia  cyclobenzaprine 5 milliGRAM(s) Oral three times a day PRN Muscle Spasm  SUMAtriptan 25 milliGRAM(s) Oral once PRN Migraine      Allergies    Bactrim (Hives)  trimethoprim (Unknown)  sulfa drugs (Unknown)  sulfadoxine (Hives)  dust (Hives)    Intolerances        Vital Signs Last 24 Hrs  T(C): 36.4 (08 Apr 2025 10:24), Max: 37.1 (07 Apr 2025 14:16)  T(F): 97.6 (08 Apr 2025 10:24), Max: 98.8 (07 Apr 2025 14:16)  HR: 93 (08 Apr 2025 10:24) (77 - 118)  BP: 138/86 (08 Apr 2025 10:24) (127/65 - 143/94)  BP(mean): --  RR: 18 (08 Apr 2025 10:24) (17 - 18)  SpO2: 97% (08 Apr 2025 10:24) (96% - 97%)    Parameters below as of 08 Apr 2025 05:03  Patient On (Oxygen Delivery Method): room air        PHYSICAL EXAM:  General: NAD.  CVS: S1, S2  Chest: air entry bilaterally present  Abd: BS present, soft, non-tender      LABS:                        10.4   8.88  )-----------( 182      ( 08 Apr 2025 06:29 )             30.4     04-08    143  |  113[H]  |  7   ----------------------------<  128[H]  3.7   |  26  |  0.54    Ca    8.9      08 Apr 2025 06:29    TPro  7.0  /  Alb  3.3  /  TBili  0.3  /  DBili  x   /  AST  11[L]  /  ALT  16  /  AlkPhos  67  04-06    PT/INR - ( 06 Apr 2025 23:30 )   PT: 10.8 sec;   INR: 0.93 ratio         PTT - ( 06 Apr 2025 23:30 )  PTT:27.2 sec    advance diet  follow CBC  If bleeding recurs will need urgent colonoscopy

## 2025-04-08 NOTE — DISCHARGE NOTE PROVIDER - NSDCCPCAREPLAN_GEN_ALL_CORE_FT
PRINCIPAL DISCHARGE DIAGNOSIS  Diagnosis: Bloody diarrhea  Assessment and Plan of Treatment: Lower GI bleeding   Imaging was done and GI was consulted  Hemoglobin was stable and no more episodes of bloody diarrhea were noted   GI specialist offered colonscopy but you informed us you would rather do outpatient  Please follow up with your GI doctor as soon as possible

## 2025-04-08 NOTE — PHARMACOTHERAPY INTERVENTION NOTE - COMMENTS
Recommended to decrease frequency of pantoprazole from twice daily to once daily as patient does not have an upper GI bleed.   Patient is on omeprazole therapy at home likely for GERD indicating the need for once daily pantoprazole.

## 2025-04-09 ENCOUNTER — INPATIENT (INPATIENT)
Facility: HOSPITAL | Age: 75
LOS: 7 days | Discharge: NURSING, REHAB OR LONG-TERM CARE | End: 2025-04-17
Attending: STUDENT IN AN ORGANIZED HEALTH CARE EDUCATION/TRAINING PROGRAM | Admitting: STUDENT IN AN ORGANIZED HEALTH CARE EDUCATION/TRAINING PROGRAM
Payer: MEDICARE

## 2025-04-09 VITALS
HEIGHT: 60 IN | HEART RATE: 109 BPM | WEIGHT: 125 LBS | RESPIRATION RATE: 20 BRPM | TEMPERATURE: 98 F | OXYGEN SATURATION: 100 % | SYSTOLIC BLOOD PRESSURE: 110 MMHG | DIASTOLIC BLOOD PRESSURE: 75 MMHG

## 2025-04-09 DIAGNOSIS — E78.5 HYPERLIPIDEMIA, UNSPECIFIED: ICD-10-CM

## 2025-04-09 DIAGNOSIS — K92.2 GASTROINTESTINAL HEMORRHAGE, UNSPECIFIED: ICD-10-CM

## 2025-04-09 DIAGNOSIS — F41.9 ANXIETY DISORDER, UNSPECIFIED: ICD-10-CM

## 2025-04-09 DIAGNOSIS — E87.6 HYPOKALEMIA: ICD-10-CM

## 2025-04-09 DIAGNOSIS — K21.9 GASTRO-ESOPHAGEAL REFLUX DISEASE WITHOUT ESOPHAGITIS: ICD-10-CM

## 2025-04-09 LAB
ALBUMIN SERPL ELPH-MCNC: 3.1 G/DL — LOW (ref 3.3–5)
ALP SERPL-CCNC: 55 U/L — SIGNIFICANT CHANGE UP (ref 40–120)
ALT FLD-CCNC: 14 U/L — SIGNIFICANT CHANGE UP (ref 12–78)
ANION GAP SERPL CALC-SCNC: 8 MMOL/L — SIGNIFICANT CHANGE UP (ref 5–17)
APTT BLD: 26.7 SEC — SIGNIFICANT CHANGE UP (ref 24.5–35.6)
AST SERPL-CCNC: 9 U/L — LOW (ref 15–37)
BASOPHILS # BLD AUTO: 0.06 K/UL — SIGNIFICANT CHANGE UP (ref 0–0.2)
BASOPHILS NFR BLD AUTO: 0.4 % — SIGNIFICANT CHANGE UP (ref 0–2)
BILIRUB SERPL-MCNC: 0.5 MG/DL — SIGNIFICANT CHANGE UP (ref 0.2–1.2)
BLD GP AB SCN SERPL QL: SIGNIFICANT CHANGE UP
BUN SERPL-MCNC: 21 MG/DL — SIGNIFICANT CHANGE UP (ref 7–23)
CALCIUM SERPL-MCNC: 8.9 MG/DL — SIGNIFICANT CHANGE UP (ref 8.5–10.1)
CHLORIDE SERPL-SCNC: 113 MMOL/L — HIGH (ref 96–108)
CO2 SERPL-SCNC: 25 MMOL/L — SIGNIFICANT CHANGE UP (ref 22–31)
CREAT SERPL-MCNC: 0.71 MG/DL — SIGNIFICANT CHANGE UP (ref 0.5–1.3)
EGFR: 89 ML/MIN/1.73M2 — SIGNIFICANT CHANGE UP
EGFR: 89 ML/MIN/1.73M2 — SIGNIFICANT CHANGE UP
EOSINOPHIL # BLD AUTO: 0.01 K/UL — SIGNIFICANT CHANGE UP (ref 0–0.5)
EOSINOPHIL NFR BLD AUTO: 0.1 % — SIGNIFICANT CHANGE UP (ref 0–6)
GLUCOSE SERPL-MCNC: 94 MG/DL — SIGNIFICANT CHANGE UP (ref 70–99)
HCT VFR BLD CALC: 24 % — LOW (ref 34.5–45)
HCT VFR BLD CALC: 25 % — LOW (ref 34.5–45)
HCT VFR BLD CALC: 25.2 % — LOW (ref 34.5–45)
HCT VFR BLD CALC: 27.9 % — LOW (ref 34.5–45)
HGB BLD-MCNC: 10.3 G/DL — LOW (ref 11.5–15.5)
HGB BLD-MCNC: 8.4 G/DL — LOW (ref 11.5–15.5)
HGB BLD-MCNC: 9 G/DL — LOW (ref 11.5–15.5)
HGB BLD-MCNC: 9.1 G/DL — LOW (ref 11.5–15.5)
IMM GRANULOCYTES NFR BLD AUTO: 0.5 % — SIGNIFICANT CHANGE UP (ref 0–0.9)
INR BLD: 1.01 RATIO — SIGNIFICANT CHANGE UP (ref 0.85–1.16)
LACTATE SERPL-SCNC: 0.9 MMOL/L — SIGNIFICANT CHANGE UP (ref 0.7–2)
LYMPHOCYTES # BLD AUTO: 1.43 K/UL — SIGNIFICANT CHANGE UP (ref 1–3.3)
LYMPHOCYTES # BLD AUTO: 10 % — LOW (ref 13–44)
MCHC RBC-ENTMCNC: 34 PG — SIGNIFICANT CHANGE UP (ref 27–34)
MCHC RBC-ENTMCNC: 34.3 PG — HIGH (ref 27–34)
MCHC RBC-ENTMCNC: 34.6 PG — HIGH (ref 27–34)
MCHC RBC-ENTMCNC: 34.8 PG — HIGH (ref 27–34)
MCHC RBC-ENTMCNC: 35 G/DL — SIGNIFICANT CHANGE UP (ref 32–36)
MCHC RBC-ENTMCNC: 36 G/DL — SIGNIFICANT CHANGE UP (ref 32–36)
MCHC RBC-ENTMCNC: 36.1 G/DL — HIGH (ref 32–36)
MCHC RBC-ENTMCNC: 36.9 G/DL — HIGH (ref 32–36)
MCV RBC AUTO: 94.3 FL — SIGNIFICANT CHANGE UP (ref 80–100)
MCV RBC AUTO: 95.1 FL — SIGNIFICANT CHANGE UP (ref 80–100)
MCV RBC AUTO: 96.2 FL — SIGNIFICANT CHANGE UP (ref 80–100)
MCV RBC AUTO: 97.2 FL — SIGNIFICANT CHANGE UP (ref 80–100)
MONOCYTES # BLD AUTO: 1.24 K/UL — HIGH (ref 0–0.9)
MONOCYTES NFR BLD AUTO: 8.7 % — SIGNIFICANT CHANGE UP (ref 2–14)
NEUTROPHILS # BLD AUTO: 11.48 K/UL — HIGH (ref 1.8–7.4)
NEUTROPHILS NFR BLD AUTO: 80.3 % — HIGH (ref 43–77)
NRBC BLD AUTO-RTO: 0 /100 WBCS — SIGNIFICANT CHANGE UP (ref 0–0)
PLATELET # BLD AUTO: 189 K/UL — SIGNIFICANT CHANGE UP (ref 150–400)
PLATELET # BLD AUTO: 191 K/UL — SIGNIFICANT CHANGE UP (ref 150–400)
PLATELET # BLD AUTO: 191 K/UL — SIGNIFICANT CHANGE UP (ref 150–400)
PLATELET # BLD AUTO: 200 K/UL — SIGNIFICANT CHANGE UP (ref 150–400)
POTASSIUM SERPL-MCNC: 3.2 MMOL/L — LOW (ref 3.5–5.3)
POTASSIUM SERPL-SCNC: 3.2 MMOL/L — LOW (ref 3.5–5.3)
PROT SERPL-MCNC: 6.3 GM/DL — SIGNIFICANT CHANGE UP (ref 6–8.3)
PROTHROM AB SERPL-ACNC: 11.7 SEC — SIGNIFICANT CHANGE UP (ref 9.9–13.4)
RBC # BLD: 2.47 M/UL — LOW (ref 3.8–5.2)
RBC # BLD: 2.6 M/UL — LOW (ref 3.8–5.2)
RBC # BLD: 2.65 M/UL — LOW (ref 3.8–5.2)
RBC # BLD: 2.96 M/UL — LOW (ref 3.8–5.2)
RBC # FLD: 12.8 % — SIGNIFICANT CHANGE UP (ref 10.3–14.5)
RBC # FLD: 12.9 % — SIGNIFICANT CHANGE UP (ref 10.3–14.5)
RBC # FLD: 12.9 % — SIGNIFICANT CHANGE UP (ref 10.3–14.5)
RBC # FLD: 13 % — SIGNIFICANT CHANGE UP (ref 10.3–14.5)
SODIUM SERPL-SCNC: 146 MMOL/L — HIGH (ref 135–145)
WBC # BLD: 11.21 K/UL — HIGH (ref 3.8–10.5)
WBC # BLD: 11.5 K/UL — HIGH (ref 3.8–10.5)
WBC # BLD: 11.93 K/UL — HIGH (ref 3.8–10.5)
WBC # BLD: 14.29 K/UL — HIGH (ref 3.8–10.5)
WBC # FLD AUTO: 11.21 K/UL — HIGH (ref 3.8–10.5)
WBC # FLD AUTO: 11.5 K/UL — HIGH (ref 3.8–10.5)
WBC # FLD AUTO: 11.93 K/UL — HIGH (ref 3.8–10.5)
WBC # FLD AUTO: 14.29 K/UL — HIGH (ref 3.8–10.5)

## 2025-04-09 PROCEDURE — 99222 1ST HOSP IP/OBS MODERATE 55: CPT

## 2025-04-09 PROCEDURE — 99285 EMERGENCY DEPT VISIT HI MDM: CPT

## 2025-04-09 PROCEDURE — 93010 ELECTROCARDIOGRAM REPORT: CPT

## 2025-04-09 RX ORDER — OMEPRAZOLE 20 MG/1
1 CAPSULE, DELAYED RELEASE ORAL
Refills: 0 | DISCHARGE

## 2025-04-09 RX ORDER — ONDANSETRON HCL/PF 4 MG/2 ML
4 VIAL (ML) INJECTION EVERY 8 HOURS
Refills: 0 | Status: DISCONTINUED | OUTPATIENT
Start: 2025-04-09 | End: 2025-04-17

## 2025-04-09 RX ORDER — LORAZEPAM 4 MG/ML
0.5 VIAL (ML) INJECTION AT BEDTIME
Refills: 0 | Status: DISCONTINUED | OUTPATIENT
Start: 2025-04-09 | End: 2025-04-16

## 2025-04-09 RX ORDER — CYCLOBENZAPRINE HYDROCHLORIDE 15 MG/1
5 CAPSULE, EXTENDED RELEASE ORAL THREE TIMES A DAY
Refills: 0 | Status: DISCONTINUED | OUTPATIENT
Start: 2025-04-09 | End: 2025-04-17

## 2025-04-09 RX ORDER — BISACODYL 5 MG
10 TABLET, DELAYED RELEASE (ENTERIC COATED) ORAL ONCE
Refills: 0 | Status: DISCONTINUED | OUTPATIENT
Start: 2025-04-09 | End: 2025-04-17

## 2025-04-09 RX ORDER — POLYETHYLENE GLYCOL-3350 AND ELECTROLYTES 236; 6.74; 5.86; 2.97; 22.74 G/274.31G; G/274.31G; G/274.31G; G/274.31G; G/274.31G
4000 POWDER, FOR SOLUTION ORAL ONCE
Refills: 0 | Status: COMPLETED | OUTPATIENT
Start: 2025-04-09 | End: 2025-04-09

## 2025-04-09 RX ORDER — ACETAMINOPHEN 500 MG/5ML
650 LIQUID (ML) ORAL EVERY 6 HOURS
Refills: 0 | Status: DISCONTINUED | OUTPATIENT
Start: 2025-04-09 | End: 2025-04-17

## 2025-04-09 RX ORDER — SODIUM CHLORIDE 9 G/1000ML
1000 INJECTION, SOLUTION INTRAVENOUS
Refills: 0 | Status: DISCONTINUED | OUTPATIENT
Start: 2025-04-09 | End: 2025-04-14

## 2025-04-09 RX ORDER — ATORVASTATIN CALCIUM 80 MG/1
20 TABLET, FILM COATED ORAL AT BEDTIME
Refills: 0 | Status: DISCONTINUED | OUTPATIENT
Start: 2025-04-09 | End: 2025-04-17

## 2025-04-09 RX ORDER — FERROUS SULFATE 137(45) MG
325 TABLET, EXTENDED RELEASE ORAL DAILY
Refills: 0 | Status: DISCONTINUED | OUTPATIENT
Start: 2025-04-09 | End: 2025-04-17

## 2025-04-09 RX ADMIN — Medication 650 MILLIGRAM(S): at 14:36

## 2025-04-09 RX ADMIN — SODIUM CHLORIDE 125 MILLILITER(S): 9 INJECTION, SOLUTION INTRAVENOUS at 11:35

## 2025-04-09 RX ADMIN — Medication 50 MILLIEQUIVALENT(S): at 11:35

## 2025-04-09 RX ADMIN — SODIUM CHLORIDE 125 MILLILITER(S): 9 INJECTION, SOLUTION INTRAVENOUS at 21:18

## 2025-04-09 RX ADMIN — POLYETHYLENE GLYCOL-3350 AND ELECTROLYTES 4000 MILLILITER(S): 236; 6.74; 5.86; 2.97; 22.74 POWDER, FOR SOLUTION ORAL at 14:38

## 2025-04-09 RX ADMIN — Medication 650 MILLIGRAM(S): at 15:26

## 2025-04-09 RX ADMIN — ATORVASTATIN CALCIUM 20 MILLIGRAM(S): 80 TABLET, FILM COATED ORAL at 21:19

## 2025-04-09 RX ADMIN — Medication 40 MILLIGRAM(S): at 14:36

## 2025-04-09 RX ADMIN — Medication 1000 MILLILITER(S): at 09:04

## 2025-04-09 RX ADMIN — Medication 325 MILLIGRAM(S): at 12:53

## 2025-04-09 RX ADMIN — Medication 0.5 MILLIGRAM(S): at 22:12

## 2025-04-09 NOTE — H&P ADULT - HISTORY OF PRESENT ILLNESS
74 years old female with h/o HLD, GERD, anxiety, h/o diverticulitis s/p colon resection in 2009, h/o GI bleed, IBS, osteoporosis present to ED with complain of bloody stool. Patient was admitted her ( 4/7-4/8) for similar complain. CTA with no active GI bleed. Hb was stable during hospital course and patient was discharged home yesterday. Today AM, patient had one large episode of bloody stool associated with near syncope. No abdominal pain. No blood thinner use.  Hemodynamically stable, afebrile, sat well at RA. WBC 14.29, Hb 10.3, plt 191, K 3.2, Na 146, Cr 0.71, lactate 0.9. EKG with NSR

## 2025-04-09 NOTE — ED ADULT TRIAGE NOTE - CHIEF COMPLAINT QUOTE
Patient BIB EMS [facility] with complaints of dizziness and bloody stool, reports recently dc home last night from inpatient for GI. PMx> Patient BIB EMS from home with complaints of dizziness and bloody stool, reports recently dc home last night from inpatient for GI. PMx>  18 gauge left forearm.

## 2025-04-09 NOTE — ED PROVIDER NOTE - OBJECTIVE STATEMENT
74F PMH GERD, IBS, pernicious anemia, osteoporosis, hx diverticular bleeding, s/p admission 4/7-8 for lower GIB BIBEMS d/t 74F PMH GERD, IBS, pernicious anemia, osteoporosis, hx diverticular bleeding / colon resection 2009, s/p admission 4/7-8 for lower GIB BIBEMS d/t bloody BM this AM. Pt reports initially admitted for frequent painless bloody BMs on Sunday, but w/o additional BM in hospital, and so was d/c'd last night. Pt admits she declined inpatient colonoscopy. Pt reports lightheadedness w/ sitting up, change in position. + h/a, believes 2/2 poor sleep. Pt states w/ abd pain at baseline, unchanged. Denies F/C, h/a, CP, palpitations, SOB, cough, N/V, UTI sx, LE pain / swelling.     PMH as above, PSH as above, appendectomy, tonsillectomy, Allergy Sulfa, Cipro, Meds as listed. 74F PMH GERD, IBS, pernicious anemia, osteoporosis, hx diverticular bleeding / colon resection 2009, hx transfusion / ICU admission for diverticular bleed 2018, s/p admission 4/7-8 for lower GIB BIBEMS d/t bloody BM a/w diaphoresis, syncope this AM. Pt reports initially admitted for frequent painless bloody BMs on Sunday night, but w/o additional BM in hospital, and so was d/c'd last night. Pt admits she declined inpatient colonoscopy d/t unable to complete bowel prep. Pt reports lightheadedness w/ sitting up, change in position. + h/a, believes 2/2 poor sleep. Pt states w/ abd pain at baseline, unchanged. Denies F/C, h/a, CP, palpitations, SOB, cough, N/V, UTI sx, LE pain / swelling.     PMH as above, PSH as above, appendectomy, tonsillectomy, Allergy Sulfa, Cipro, Meds as listed.

## 2025-04-09 NOTE — ED ADULT NURSE NOTE - OBJECTIVE STATEMENT
pt is AOX4 present to the ED c/o dizziness and blood stool since yesterday. states  she was discharged from the ED yesterday for similar symptoms. state she had 5 episode of bloody stool yesterday and one today. also c/o feeling faint when she stands. history of anemia, previous GI bleeds, anxiety and high cholesterol.

## 2025-04-09 NOTE — H&P ADULT - NSHPPHYSICALEXAM_GEN_ALL_CORE
CONSTITUTIONAL: alert and cooperative, no acute distress.   EYES: PERRL,  no scleral icterus  ENT: Mucosa moist, tongue normal  NECK: Neck supple, trachea midline, non-tender  CARDIAC: Normal S1 and S2. Regular rate and rhythms.  No Pedal edema  LUNGS: Equal air entry both lungs. No rales, rhonchi, wheezing. Normal respiratory effort.   ABDOMEN: Soft, nondistended, nontender. No guarding or rebound tenderness. No hepatomegaly or splenomegaly. Bowel sound normal.  MUSCULOSKELETAL: Normocephalic, atraumatic. No significant deformity or joint abnormality  NEUROLOGICAL: No gross motor or sensory deficits  SKIN: no lesions or eruptions. Normal turgor  PSYCHIATRIC: A&O x 3, appropriate mood and affect

## 2025-04-09 NOTE — ED PROVIDER NOTE - CLINICAL SUMMARY MEDICAL DECISION MAKING FREE TEXT BOX
74F 74F PMH GERD, IBS, pernicious anemia, osteoporosis, hx diverticular bleeding / colon resection 2009, s/p admission 4/7-8 for lower GIB BIBEMS d/t bloody BM this AM. Afebrile, VSS other than + mild tachycardia. Pt overall well-appearing, in NAD. Exam as noted in PE. Plan for CBC, CMP, T&S, coags, ECG, CTA AP. Give IVF. Re-eval. 74F PMH GERD, IBS, pernicious anemia, osteoporosis, hx diverticular bleeding / colon resection 2009, hx transfusion / ICU admission for diverticular bleed 2018, s/p admission 4/7-8 for lower GIB BIBEMS d/t bloody BM a/w diaphoresis, syncope this AM.. Afebrile, VSS other than + mild tachycardia. Pt overall well-appearing, in NAD. Exam as noted in PE. Plan for CBC, CMP, lactate, trop, T&S, coags, ECG +/- CTA AP. Give IVF. Re-eval. 74F PMH GERD, IBS, pernicious anemia, osteoporosis, hx diverticular bleeding / colon resection 2009, hx transfusion / ICU admission for diverticular bleed 2018, s/p admission 4/7-8 for lower GIB BIBEMS d/t bloody BM a/w diaphoresis, syncope this AM. Afebrile, VSS other than + mild tachycardia. Pt overall well-appearing, in NAD. Exam as noted in PE. Plan for CBC, CMP, lactate, trop, T&S, coags, ECG +/- CTA AP. Give IVF. Re-eval.  W/u significant for: ECG NSR w/o evidence acute ischemia, trop negative. + leukocytosis to 14.2 (uptrending), H/H stable. Pt hemodynamically stable. D/w GI (Dr Malave), w/o need for rpt CTA at this time, will see pt in the afternoon. Will admit to Tele (d/w Dr Sanderson). Pt updated to results, admission. She understands / agrees w/ this plan.

## 2025-04-09 NOTE — ED ADULT NURSE NOTE - CHIEF COMPLAINT QUOTE
Patient BIB EMS from home with complaints of dizziness and bloody stool, reports recently dc home last night from inpatient for GI. PMx>  18 gauge left forearm.

## 2025-04-09 NOTE — PATIENT PROFILE ADULT - FALL HARM RISK - HARM RISK INTERVENTIONS

## 2025-04-09 NOTE — ED PROVIDER NOTE - NSCAREINITIATED _GEN_ER
Irbesartan 150 mg  Filled 6-25-22  Qty 30  2 refills  No upcoming appt.   LOV 9-1-22 SM Bertha Bowman(Attending)

## 2025-04-09 NOTE — CHART NOTE - NSCHARTNOTEFT_GEN_A_CORE
Patient is a 74y old  Female who presents with a chief complaint of lower GI bleed (09 Apr 2025 14:06)    Called by RN to evaluate pt. with c/o   Vital Signs Last 24 Hrs  T(C): 36.7 (09 Apr 2025 16:33), Max: 37 (09 Apr 2025 12:40)  T(F): 98 (09 Apr 2025 16:33), Max: 98.6 (09 Apr 2025 12:40)  HR: 101 (09 Apr 2025 16:33) (101 - 112)  BP: 134/84 (09 Apr 2025 16:33) (110/75 - 139/95)  BP(mean): --  RR: 18 (09 Apr 2025 16:33) (16 - 20)  SpO2: 97% (09 Apr 2025 16:33) (97% - 100%)    Parameters below as of 09 Apr 2025 16:33  Patient On (Oxygen Delivery Method): room air          PT/INR - ( 09 Apr 2025 08:59 )   PT: 11.7 sec;   INR: 1.01 ratio         PTT - ( 09 Apr 2025 08:59 )  PTT:26.7 sec                        9.1    11.21 )-----------( 200      ( 09 Apr 2025 16:00 )             25.2     04-09    146[H]  |  113[H]  |  21  ----------------------------<  94  3.2[L]   |  25  |  0.71    Ca    8.9      09 Apr 2025 08:59    TPro  6.3  /  Alb  3.1[L]  /  TBili  0.5  /  DBili  x   /  AST  9[L]  /  ALT  14  /  AlkPhos  55  04-09    LIVER FUNCTIONS - ( 09 Apr 2025 08:59 )  Alb: 3.1 g/dL / Pro: 6.3 gm/dL / ALK PHOS: 55 U/L / ALT: 14 U/L / AST: 9 U/L / GGT: x                                   CAPILLARY BLOOD GLUCOSE        acetaminophen     Tablet .. 650 milliGRAM(s) Oral every 6 hours PRN  atorvastatin 20 milliGRAM(s) Oral at bedtime  bisacodyl 10 milliGRAM(s) Oral once  cyclobenzaprine 5 milliGRAM(s) Oral three times a day PRN  ferrous    sulfate 325 milliGRAM(s) Oral daily  lactated ringers. 1000 milliLiter(s) IV Continuous <Continuous>  LORazepam     Tablet 0.5 milliGRAM(s) Oral at bedtime  ondansetron Injectable 4 milliGRAM(s) IV Push every 8 hours PRN  pantoprazole    Tablet 40 milliGRAM(s) Oral before breakfast      REVIEW OF SYSTEMS:    RESPIRATORY: No cough, wheezing, chills or hemoptysis; No shortness of breath  CARDIOVASCULAR: No chest pain, palpitations, dizziness, or leg swelling  GASTROINTESTINAL: No abdominal or epigastric pain. No nausea, vomiting, or hematemesis; No diarrhea or constipation. No melena or hematochezia.  GENITOURINARY: No dysuria, frequency, hematuria, or incontinence  NEUROLOGICAL: No headaches, memory loss, loss of strength, numbness, or tremors      PHYSICAL EXAM:    GENERAL: NAD, well-groomed, well-developed  NERVOUS SYSTEM:  Alert & Oriented X3, Good concentration; Motor Strength 5/5 B/L upper and lower extremities; DTRs 2+ intact and symmetric  CHEST/LUNG: Clear to percussion bilaterally; No rales, rhonchi, wheezing, or rubs  HEART: Regular rate and rhythm; No murmurs, rubs, or gallops  ABDOMEN: Soft, Nontender, Nondistended; Bowel sounds present  EXTREMITIES:  2+ Peripheral Pulses, No clubbing, cyanosis, or edema    Assessment: Patient 74y with GI BLEED    RECTAL BLEEDING    Diverticulitis    GERD (Gastroesophageal Reflux    IBS (Irritable Bowel Syndrome)    Age Related Osteoporosis    Status Post Laparoscopic-Margie    Status Post Appendectomy    Status Post Tonsillectomy        Plan:  - Continue current treatment  - Follow up labs  - D/w                       aware and agree with the plan  - Will continue to follow up Patient is a 74y old  Female who presents with a chief complaint of lower GI bleed (09 Apr 2025 14:06)    Called by RN to evaluate pt. with c/o with bloody bowel movement  Vital Signs Last 24 Hrs  T(C): 36.7 (09 Apr 2025 16:33), Max: 37 (09 Apr 2025 12:40)  T(F): 98 (09 Apr 2025 16:33), Max: 98.6 (09 Apr 2025 12:40)  HR: 101 (09 Apr 2025 16:33) (101 - 112)  BP: 134/84 (09 Apr 2025 16:33) (110/75 - 139/95)  BP(mean): --  RR: 18 (09 Apr 2025 16:33) (16 - 20)  SpO2: 97% (09 Apr 2025 16:33) (97% - 100%)    Parameters below as of 09 Apr 2025 16:33  Patient On (Oxygen Delivery Method): room air          PT/INR - ( 09 Apr 2025 08:59 )   PT: 11.7 sec;   INR: 1.01 ratio         PTT - ( 09 Apr 2025 08:59 )  PTT:26.7 sec                        9.1    11.21 )-----------( 200      ( 09 Apr 2025 16:00 )             25.2     04-09    146[H]  |  113[H]  |  21  ----------------------------<  94  3.2[L]   |  25  |  0.71    Ca    8.9      09 Apr 2025 08:59    TPro  6.3  /  Alb  3.1[L]  /  TBili  0.5  /  DBili  x   /  AST  9[L]  /  ALT  14  /  AlkPhos  55  04-09    LIVER FUNCTIONS - ( 09 Apr 2025 08:59 )  Alb: 3.1 g/dL / Pro: 6.3 gm/dL / ALK PHOS: 55 U/L / ALT: 14 U/L / AST: 9 U/L / GGT: x                                   CAPILLARY BLOOD GLUCOSE        acetaminophen     Tablet .. 650 milliGRAM(s) Oral every 6 hours PRN  atorvastatin 20 milliGRAM(s) Oral at bedtime  bisacodyl 10 milliGRAM(s) Oral once  cyclobenzaprine 5 milliGRAM(s) Oral three times a day PRN  ferrous    sulfate 325 milliGRAM(s) Oral daily  lactated ringers. 1000 milliLiter(s) IV Continuous <Continuous>  LORazepam     Tablet 0.5 milliGRAM(s) Oral at bedtime  ondansetron Injectable 4 milliGRAM(s) IV Push every 8 hours PRN  pantoprazole    Tablet 40 milliGRAM(s) Oral before breakfast      REVIEW OF SYSTEMS:    RESPIRATORY: No cough, wheezing, chills or hemoptysis; No shortness of breath  CARDIOVASCULAR: No chest pain, palpitations, dizziness, or leg swelling  GASTROINTESTINAL: No abdominal or epigastric pain. No nausea, vomiting, or hematemesis; No diarrhea or constipation. No melena or hematochezia.  GENITOURINARY: No dysuria, frequency, hematuria, or incontinence  NEUROLOGICAL: No headaches, memory loss, loss of strength, numbness, or tremors      PHYSICAL EXAM:    GENERAL: NAD, well-groomed, well-developed  NERVOUS SYSTEM:  Alert & Oriented X3, Good concentration; Motor Strength 5/5 B/L upper and lower extremities; DTRs 2+ intact and symmetric  CHEST/LUNG: Clear to percussion bilaterally; No rales, rhonchi, wheezing, or rubs  HEART: Regular rate and rhythm; No murmurs, rubs, or gallops  ABDOMEN: Soft, Nontender, Nondistended; Bowel sounds present  EXTREMITIES:  2+ Peripheral Pulses, No clubbing, cyanosis, or edema    Assessment: Patient 74y with GI BLEED    RECTAL BLEEDING    Diverticulitis    GERD (Gastroesophageal Reflux    IBS (Irritable Bowel Syndrome)    Age Related Osteoporosis    Status Post Laparoscopic-Margie    Status Post Appendectomy    Status Post Tonsillectomy        Plan:  - Continue current treatment  - Follow up labs  - D/w                       aware and agree with the plan  - Will continue to follow up Patient is a 74y old  Female who presents with a chief complaint of lower GI bleed (09 Apr 2025 14:06)    Called by RN to evaluate pt. with c/o with bloody bowel movement  74 years old female with h/o HLD, GERD, anxiety, h/o diverticulitis s/p colon resection in 2009, h/o GI bleed, IBS, osteoporosis present to ED with complain of bloody stool. Patient was admitted her ( 4/7-4/8) for similar complain. CTA with no active GI bleed. Hb was stable during hospital course and patient was discharged home yesterday. Today AM, patient had one large episode of bloody stool associated with near syncope. No abdominal pain. No blood thinner use.  Hemodynamically stable, afebrile, sat well at RA. WBC 14.29, Hb 10.3, plt 191, K 3.2, Na 146, Cr 0.71, lactate 0.9. EKG with NSR    Pt. seen and evaluated at bedside large bloody bowel movement noted in bedpan   /83     Vital Signs Last 24 Hrs  T(C): 36.7 (09 Apr 2025 16:33), Max: 37 (09 Apr 2025 12:40)  T(F): 98 (09 Apr 2025 16:33), Max: 98.6 (09 Apr 2025 12:40)  HR: 101 (09 Apr 2025 16:33) (101 - 112)  BP: 134/84 (09 Apr 2025 16:33) (110/75 - 139/95)  BP(mean): --  RR: 18 (09 Apr 2025 16:33) (16 - 20)  SpO2: 97% (09 Apr 2025 16:33) (97% - 100%)          acetaminophen     Tablet .. 650 milliGRAM(s) Oral every 6 hours PRN  atorvastatin 20 milliGRAM(s) Oral at bedtime  bisacodyl 10 milliGRAM(s) Oral once  cyclobenzaprine 5 milliGRAM(s) Oral three times a day PRN  ferrous    sulfate 325 milliGRAM(s) Oral daily  lactated ringers. 1000 milliLiter(s) IV Continuous <Continuous>  LORazepam     Tablet 0.5 milliGRAM(s) Oral at bedtime  ondansetron Injectable 4 milliGRAM(s) IV Push every 8 hours PRN  pantoprazole    Tablet 40 milliGRAM(s) Oral before breakfast        Assessment:   74 years old female with h/o HLD, GERD, anxiety, h/o diverticulitis s/p colon resection in 2009, h/o GI bleed, IBS, osteoporosis present to ED with complain of bloody stool. Patient was admitted her ( 4/7-4/8) for similar complain. CTA with no active GI bleed. Hb was stable during hospital course and patient was discharged home yesterday. Today AM, patient had one large episode of bloody stool associated with near syncope.   Pt. now being evaluated for an episode of bloody bowel movement         Plan:  -Stat CBC   - Continue current treatment  - Follow up labs  - D/w                       aware and agree with the plan  - Will continue to follow up Patient is a 74y old  Female who presents with a chief complaint of lower GI bleed (09 Apr 2025 14:06)    Called by RN to evaluate pt. with c/o with bloody bowel movement  74 years old female with h/o HLD, GERD, anxiety, h/o diverticulitis s/p colon resection in 2009, h/o GI bleed, IBS, osteoporosis present to ED with complain of bloody stool. Patient was admitted her ( 4/7-4/8) for similar complain. CTA with no active GI bleed. Hb was stable during hospital course and patient was discharged home yesterday. Today AM, patient had one large episode of bloody stool associated with near syncope. No abdominal pain. No blood thinner use.  Hemodynamically stable, afebrile, sat well at RA. WBC 14.29, Hb 10.3, plt 191, K 3.2, Na 146, Cr 0.71, lactate 0.9. EKG with NSR    Pt. seen and evaluated at bedside large bloody bowel movement noted in bedpan   /83     Vital Signs Last 24 Hrs  T(C): 36.7 (09 Apr 2025 16:33), Max: 37 (09 Apr 2025 12:40)  T(F): 98 (09 Apr 2025 16:33), Max: 98.6 (09 Apr 2025 12:40)  HR: 101 (09 Apr 2025 16:33) (101 - 112)  BP: 134/84 (09 Apr 2025 16:33) (110/75 - 139/95)  BP(mean): --  RR: 18 (09 Apr 2025 16:33) (16 - 20)  SpO2: 97% (09 Apr 2025 16:33) (97% - 100%)          acetaminophen     Tablet .. 650 milliGRAM(s) Oral every 6 hours PRN  atorvastatin 20 milliGRAM(s) Oral at bedtime  bisacodyl 10 milliGRAM(s) Oral once  cyclobenzaprine 5 milliGRAM(s) Oral three times a day PRN  ferrous    sulfate 325 milliGRAM(s) Oral daily  lactated ringers. 1000 milliLiter(s) IV Continuous <Continuous>  LORazepam     Tablet 0.5 milliGRAM(s) Oral at bedtime  ondansetron Injectable 4 milliGRAM(s) IV Push every 8 hours PRN  pantoprazole    Tablet 40 milliGRAM(s) Oral before breakfast        Assessment:   74 years old female with h/o HLD, GERD, anxiety, h/o diverticulitis s/p colon resection in 2009, h/o GI bleed, IBS, osteoporosis present to ED with complain of bloody stool. Patient was admitted her ( 4/7-4/8) for similar complain. CTA with no active GI bleed. Hb was stable during hospital course and patient was discharged home yesterday. Today AM, patient had one large episode of bloody stool associated with near syncope.   Pt. now being evaluated for an episode of bloody bowel movement         Plan:  -Stat CBC ordered resulted 9.0/25.0  - Continue current treatment  -Follow-up with attending

## 2025-04-09 NOTE — H&P ADULT - PROBLEM SELECTOR PLAN 1
bloody bowel movement  CTA 4/7/25  ( I personally review) no active GI bleed. Noted diverticulosis  EKG  ( I personally review) NSR  Hb 10.3, stable  Clear liquid diet, NPO after midnight  Bowel prep for colonoscopy tomorrow  IV fluid  Monitor H/H, repeat lab ordered for PM  GI consulted

## 2025-04-10 DIAGNOSIS — Z88.2 ALLERGY STATUS TO SULFONAMIDES: ICD-10-CM

## 2025-04-10 DIAGNOSIS — M79.7 FIBROMYALGIA: ICD-10-CM

## 2025-04-10 DIAGNOSIS — K29.70 GASTRITIS, UNSPECIFIED, WITHOUT BLEEDING: ICD-10-CM

## 2025-04-10 DIAGNOSIS — R19.5 OTHER FECAL ABNORMALITIES: ICD-10-CM

## 2025-04-10 DIAGNOSIS — Z88.1 ALLERGY STATUS TO OTHER ANTIBIOTIC AGENTS: ICD-10-CM

## 2025-04-10 DIAGNOSIS — K55.21 ANGIODYSPLASIA OF COLON WITH HEMORRHAGE: ICD-10-CM

## 2025-04-10 DIAGNOSIS — M81.0 AGE-RELATED OSTEOPOROSIS WITHOUT CURRENT PATHOLOGICAL FRACTURE: ICD-10-CM

## 2025-04-10 DIAGNOSIS — D51.0 VITAMIN B12 DEFICIENCY ANEMIA DUE TO INTRINSIC FACTOR DEFICIENCY: ICD-10-CM

## 2025-04-10 DIAGNOSIS — D50.9 IRON DEFICIENCY ANEMIA, UNSPECIFIED: ICD-10-CM

## 2025-04-10 LAB
ALBUMIN SERPL ELPH-MCNC: 2.8 G/DL — LOW (ref 3.3–5)
ALP SERPL-CCNC: 45 U/L — SIGNIFICANT CHANGE UP (ref 40–120)
ALT FLD-CCNC: 11 U/L — LOW (ref 12–78)
ANION GAP SERPL CALC-SCNC: 7 MMOL/L — SIGNIFICANT CHANGE UP (ref 5–17)
AST SERPL-CCNC: 14 U/L — LOW (ref 15–37)
BILIRUB SERPL-MCNC: 0.7 MG/DL — SIGNIFICANT CHANGE UP (ref 0.2–1.2)
BUN SERPL-MCNC: 16 MG/DL — SIGNIFICANT CHANGE UP (ref 7–23)
CALCIUM SERPL-MCNC: 8.2 MG/DL — LOW (ref 8.5–10.1)
CHLORIDE SERPL-SCNC: 113 MMOL/L — HIGH (ref 96–108)
CO2 SERPL-SCNC: 23 MMOL/L — SIGNIFICANT CHANGE UP (ref 22–31)
CREAT SERPL-MCNC: 0.43 MG/DL — LOW (ref 0.5–1.3)
EGFR: 101 ML/MIN/1.73M2 — SIGNIFICANT CHANGE UP
EGFR: 101 ML/MIN/1.73M2 — SIGNIFICANT CHANGE UP
GLUCOSE SERPL-MCNC: 123 MG/DL — HIGH (ref 70–99)
HCT VFR BLD CALC: 19.6 % — CRITICAL LOW (ref 34.5–45)
HCT VFR BLD CALC: 26 % — LOW (ref 34.5–45)
HCT VFR BLD CALC: 28.4 % — LOW (ref 34.5–45)
HGB BLD-MCNC: 10.2 G/DL — LOW (ref 11.5–15.5)
HGB BLD-MCNC: 7.1 G/DL — LOW (ref 11.5–15.5)
HGB BLD-MCNC: 9.4 G/DL — LOW (ref 11.5–15.5)
MAGNESIUM SERPL-MCNC: 1.3 MG/DL — LOW (ref 1.6–2.6)
MCHC RBC-ENTMCNC: 32 PG — SIGNIFICANT CHANGE UP (ref 27–34)
MCHC RBC-ENTMCNC: 32 PG — SIGNIFICANT CHANGE UP (ref 27–34)
MCHC RBC-ENTMCNC: 35.1 PG — HIGH (ref 27–34)
MCHC RBC-ENTMCNC: 35.9 G/DL — SIGNIFICANT CHANGE UP (ref 32–36)
MCHC RBC-ENTMCNC: 35.9 G/DL — SIGNIFICANT CHANGE UP (ref 32–36)
MCHC RBC-ENTMCNC: 36.2 G/DL — HIGH (ref 32–36)
MCV RBC AUTO: 88.4 FL — SIGNIFICANT CHANGE UP (ref 80–100)
MCV RBC AUTO: 89 FL — SIGNIFICANT CHANGE UP (ref 80–100)
MCV RBC AUTO: 98 FL — SIGNIFICANT CHANGE UP (ref 80–100)
NRBC BLD AUTO-RTO: 0 /100 WBCS — SIGNIFICANT CHANGE UP (ref 0–0)
PHOSPHATE SERPL-MCNC: 2.6 MG/DL — SIGNIFICANT CHANGE UP (ref 2.5–4.5)
PLATELET # BLD AUTO: 125 K/UL — LOW (ref 150–400)
PLATELET # BLD AUTO: 141 K/UL — LOW (ref 150–400)
PLATELET # BLD AUTO: 169 K/UL — SIGNIFICANT CHANGE UP (ref 150–400)
POTASSIUM SERPL-MCNC: 3.4 MMOL/L — LOW (ref 3.5–5.3)
POTASSIUM SERPL-SCNC: 3.4 MMOL/L — LOW (ref 3.5–5.3)
PROT SERPL-MCNC: 5.3 GM/DL — LOW (ref 6–8.3)
RBC # BLD: 2.02 M/UL — LOW (ref 3.8–5.2)
RBC # BLD: 2.94 M/UL — LOW (ref 3.8–5.2)
RBC # BLD: 3.19 M/UL — LOW (ref 3.8–5.2)
RBC # FLD: 12.9 % — SIGNIFICANT CHANGE UP (ref 10.3–14.5)
RBC # FLD: 15.7 % — HIGH (ref 10.3–14.5)
RBC # FLD: 15.9 % — HIGH (ref 10.3–14.5)
SODIUM SERPL-SCNC: 143 MMOL/L — SIGNIFICANT CHANGE UP (ref 135–145)
WBC # BLD: 10.63 K/UL — HIGH (ref 3.8–10.5)
WBC # BLD: 10.91 K/UL — HIGH (ref 3.8–10.5)
WBC # BLD: 11.27 K/UL — HIGH (ref 3.8–10.5)
WBC # FLD AUTO: 10.63 K/UL — HIGH (ref 3.8–10.5)
WBC # FLD AUTO: 10.91 K/UL — HIGH (ref 3.8–10.5)
WBC # FLD AUTO: 11.27 K/UL — HIGH (ref 3.8–10.5)

## 2025-04-10 PROCEDURE — 99233 SBSQ HOSP IP/OBS HIGH 50: CPT | Mod: FS

## 2025-04-10 PROCEDURE — 74174 CTA ABD&PLVS W/CONTRAST: CPT | Mod: 26

## 2025-04-10 PROCEDURE — 99233 SBSQ HOSP IP/OBS HIGH 50: CPT

## 2025-04-10 RX ORDER — ONDANSETRON HCL/PF 4 MG/2 ML
4 VIAL (ML) INJECTION ONCE
Refills: 0 | Status: DISCONTINUED | OUTPATIENT
Start: 2025-04-10 | End: 2025-04-10

## 2025-04-10 RX ORDER — MAGNESIUM SULFATE 500 MG/ML
1 SYRINGE (ML) INJECTION ONCE
Refills: 0 | Status: COMPLETED | OUTPATIENT
Start: 2025-04-10 | End: 2025-04-10

## 2025-04-10 RX ADMIN — SODIUM CHLORIDE 125 MILLILITER(S): 9 INJECTION, SOLUTION INTRAVENOUS at 20:18

## 2025-04-10 RX ADMIN — Medication 40 MILLIGRAM(S): at 05:23

## 2025-04-10 RX ADMIN — Medication 100 MILLIEQUIVALENT(S): at 14:41

## 2025-04-10 RX ADMIN — Medication 0.5 MILLIGRAM(S): at 22:07

## 2025-04-10 RX ADMIN — Medication 100 MILLIEQUIVALENT(S): at 13:20

## 2025-04-10 RX ADMIN — Medication 325 MILLIGRAM(S): at 12:18

## 2025-04-10 RX ADMIN — Medication 100 GRAM(S): at 12:17

## 2025-04-10 RX ADMIN — SODIUM CHLORIDE 125 MILLILITER(S): 9 INJECTION, SOLUTION INTRAVENOUS at 05:23

## 2025-04-10 RX ADMIN — ATORVASTATIN CALCIUM 20 MILLIGRAM(S): 80 TABLET, FILM COATED ORAL at 22:07

## 2025-04-10 NOTE — PROGRESS NOTE ADULT - SUBJECTIVE AND OBJECTIVE BOX
HPI:  74 years old female with h/o HLD, GERD, anxiety, h/o diverticulitis s/p colon resection in 2009, h/o GI bleed, IBS, osteoporosis present to ED with complain of bloody stool. Patient was admitted her ( 4/7-4/8) for similar complain. CTA with no active GI bleed. Hb was stable during hospital course and patient was discharged home yesterday. Today AM, patient had one large episode of bloody stool associated with near syncope. No abdominal pain. No blood thinner use.  Hemodynamically stable, afebrile, sat well at RA. WBC 14.29, Hb 10.3, plt 191, K 3.2, Na 146, Cr 0.71, lactate 0.9. EKG with NSR (09 Apr 2025 11:28)  Patient is a 74y old  Female who presents with a chief complaint of lower GI bleed (10 Apr 2025 08:50)      INTERVAL HPI/OVERNIGHT EVENTS: continued bloody stool however hemdynamically stble     MEDICATIONS  (STANDING):  atorvastatin 20 milliGRAM(s) Oral at bedtime  bisacodyl 10 milliGRAM(s) Oral once  ferrous    sulfate 325 milliGRAM(s) Oral daily  lactated ringers. 1000 milliLiter(s) (125 mL/Hr) IV Continuous <Continuous>  LORazepam     Tablet 0.5 milliGRAM(s) Oral at bedtime  pantoprazole    Tablet 40 milliGRAM(s) Oral before breakfast    MEDICATIONS  (PRN):  acetaminophen     Tablet .. 650 milliGRAM(s) Oral every 6 hours PRN Mild Pain (1 - 3), Moderate Pain (4 - 6)  cyclobenzaprine 5 milliGRAM(s) Oral three times a day PRN Muscle Spasm  ondansetron Injectable 4 milliGRAM(s) IV Push every 8 hours PRN Nausea and/or Vomiting      Allergies    Bactrim (Hives)  trimethoprim (Unknown)  dust (Hives)  sulfa drugs (Unknown)  sulfadoxine (Hives)    Intolerances        REVIEW OF SYSTEMS:  pain free but rectal bleeding     Vital Signs Last 24 Hrs  T(C): 37.2 (10 Apr 2025 20:10), Max: 37.2 (10 Apr 2025 11:23)  T(F): 99 (10 Apr 2025 20:10), Max: 99 (10 Apr 2025 20:10)  HR: 88 (10 Apr 2025 20:10) (88 - 110)  BP: 101/67 (10 Apr 2025 20:10) (101/67 - 138/93)-  RR: 18 (10 Apr 2025 20:10) (14 - 19)  SpO2: 97% (10 Apr 2025 20:10) (96% - 100%)    Parameters below as of 10 Apr 2025 20:10  Patient On (Oxygen Delivery Method): room air        PHYSICAL EXAM:  GENERAL: NAD, well-groomed, well-developed  HEAD:  Atraumatic, Normocephalic  EYES: EOMI, PERRLA, conjunctiva and sclera clear  ENMT: No tonsillar erythema, exudates, or enlargement; Moist mucous membranes, Good dentition, No lesions  NECK: Supple, No JVD, Normal thyroid  NERVOUS SYSTEM:  Alert & Oriented X3, Good concentration; Motor Strength 5/5 B/L upper and lower extremities; DTRs 2+ intact and symmetric  CHEST/LUNG: Clear to ascultation  bilaterally; No rales, rhonchi, wheezing, or rubs  HEART: Regular rate and rhythm; No murmurs, rubs, or gallops  ABDOMEN: Soft, Nontender, Nondistended; bloody stool   EXTREMITIES:  2+ Peripheral Pulses, No clubbing, cyanosis, or edema  LYMPH: No lymphadenopathy noted  SKIN: No rashes or lesions    LABS:                        9.4    10.63 )-----------( 141      ( 10 Apr 2025 14:40 )             26.0     04-10    143  |  113[H]  |  16  ----------------------------<  123[H]  3.4[L]   |  23  |  0.43[L]    Ca    8.2[L]      10 Apr 2025 06:03  Phos  2.6     04-10  Mg     1.3     04-10    TPro  5.3[L]  /  Alb  2.8[L]  /  TBili  0.7  /  DBili  x   /  AST  14[L]  /  ALT  11[L]  /  AlkPhos  45  04-10    PT/INR - ( 09 Apr 2025 08:59 )   PT: 11.7 sec;   INR: 1.01 ratio         PTT - ( 09 Apr 2025 08:59 )  PTT:26.7 sec  Urinalysis Basic - ( 10 Apr 2025 06:03 )    Color: x / Appearance: x / SG: x / pH: x  Gluc: 123 mg/dL / Ketone: x  / Bili: x / Urobili: x   Blood: x / Protein: x / Nitrite: x   Leuk Esterase: x / RBC: x / WBC x   Sq Epi: x / Non Sq Epi: x / Bacteria: x      CAPILLARY BLOOD GLUCOSE          RADIOLOGY & ADDITIONAL TESTS:  < from: CT Angio Abdomen and Pelvis w/ IV Cont (04.10.25 @ 15:57) >    PROCEDURE DATE:  04/10/2025          INTERPRETATION:  CLINICAL INFORMATION: rectal bleeding FCT    COMPARISON: 4.7.25    CONTRAST/COMPLICATIONS:  IV Contrast: Omnipaque 350  90 cc administered   10 cc discarded  Oral Contrast: NONE  .    PROCEDURE:  CT of the Abdomen and Pelvis was performed.  Precontrast, Arterial and Delayed phases were performed.  Sagittal and coronal reformats were performed.    FINDINGS:    LOWER CHEST: Within normal limits.    LIVER: Cysts and other lesions too small to characterize.  BILE DUCTS: Normal caliber.  GALLBLADDER: Within normal limits.  SPLEEN: Within normal limits.  PANCREAS: Within normal limits.  ADRENALS: Within normal limits.  KIDNEYS/URETERS: Cysts and other lesions too small to characterize.    BLADDER: Within normal limits.  REPRODUCTIVE ORGANS: Within normal limits.    BOWEL: No bowel obstruction. Rectosigmoid anastamosis.  No GI bleed.  PERITONEUM/RETROPERITONEUM: Within normal limits.  VESSELS:  Within normal limits.  LYMPH NODES: Within normal limits.  ABDOMINAL WALL: Within normal limits.  BONES: Within normal limits.    IMPRESSION: No GI bleed.    < end of copied text >    Imaging Personally Reviewed:  [X ] YES  [ ] NO    Consultant(s) Notes Reviewed:  [ X] YES  [ ] NO    Care Discussed with Consultants/Other Providers [ X] YES  [ ] NO

## 2025-04-10 NOTE — CHART NOTE - NSCHARTNOTEFT_GEN_A_CORE
JANET DONALD  MRN-[id number]  Patient is a 74y old  Female who presents with a chief complaint of lower GI bleed (10 Apr 2025 08:50)    HPI:  74 years old female with h/o HLD, GERD, anxiety, h/o diverticulitis s/p colon resection in 2009, h/o GI bleed, IBS, osteoporosis present to ED with complain of bloody stool. Patient was admitted her ( 4/7-4/8) for similar complain. CTA with no active GI bleed. Hb was stable during hospital course and patient was discharged home yesterday. Today AM, patient had one large episode of bloody stool associated with near syncope. No abdominal pain. No blood thinner use.  Hemodynamically stable, afebrile, sat well at RA. WBC 14.29, Hb 10.3, plt 191, K 3.2, Na 146, Cr 0.71, lactate 0.9. EKG with NSR (09 Apr 2025 11:28)      24 HOUR EVENTS: Called by RN due to patient having multiple bloody bowel movements s/p colonoscopy. Patient states she feels the same as when she arrived to the hospital. She is currently receiving second unit of blood at this time. Patient endorses feeling weak. Patient denies any SOB, chest pain, abdominal pain, nausea/vomiting at this time.       Vital Signs Last 24 Hrs  T(F): 98.9  HR: 92  BP: 109/73  RR: 17  SpO2: 98%    O2 Parameters below as of 10 Apr 2025 14:09  Patient On (Oxygen Delivery Method): room air        ROS:   Negative for 12 body systems unless otherwise specified in HPI    Physical Exam  General: Alert, weak  Chest/Lungs: No respiratory distress  Heart: RRR  Abd: Soft, nontender nondistended, no guarding  Skin: warm, dry  Neuro: A&O x 4        Telemetry: Reviewed, NSR    Consultant(s) Notes Reviewed:  [x] YES  [ ] NO    Assessment/Plan:   - Urgent CTA Abd/Pelv ordered per rec from GI attending  - Patient currently hemodynamically stable  -  Monitor Hgb, cbc q6h  - Patient to complete a total of 3u prbcs    Case Discussed with Dr. Ahn  Case Discussed with Dr. Kohler  Case Discussed with RN    45 mins assessing presenting problems of acute illness. Medical decision making including Initiating plan of care, reviewing data, reviewing radiology, discussing with multidisciplinary team, non inclusive of procedures, discussing goals of care with patient/family JANET DONALD  [id number]  Patient is a 74y old  Female who presents with a chief complaint of lower GI bleed (10 Apr 2025 08:50)    HPI:  74 years old female with h/o HLD, GERD, anxiety, h/o diverticulitis s/p colon resection in 2009, h/o GI bleed, IBS, osteoporosis present to ED with complain of bloody stool. Patient was admitted her ( 4/7-4/8) for similar complain. CTA with no active GI bleed. Hb was stable during hospital course and patient was discharged home yesterday. Today AM, patient had one large episode of bloody stool associated with near syncope. No abdominal pain. No blood thinner use.  Hemodynamically stable, afebrile, sat well at RA. WBC 14.29, Hb 10.3, plt 191, K 3.2, Na 146, Cr 0.71, lactate 0.9. EKG with NSR (09 Apr 2025 11:28)      24 HOUR EVENTS: Called by RN due to patient having multiple bloody bowel movements s/p colonoscopy. Patient states she feels the same as when she arrived to the hospital. She is currently receiving second unit of blood at this time. Patient endorses feeling weak. Patient denies any SOB, chest pain, abdominal pain, nausea/vomiting at this time.       Vital Signs Last 24 Hrs  T(F): 98.9  HR: 92  BP: 109/73  RR: 17  SpO2: 98%    O2 Parameters below as of 10 Apr 2025 14:09  Patient On (Oxygen Delivery Method): room air        ROS:   Negative for 12 body systems unless otherwise specified in HPI    Physical Exam  General: Alert, weak  Chest/Lungs: No respiratory distress  Heart: RRR  Abd: Soft, nontender nondistended, no guarding  Skin: warm, dry  Neuro: A&O x 4        Telemetry: Reviewed, NSR    Consultant(s) Notes Reviewed:  [x] YES  [ ] NO    Assessment/Plan:   - Urgent CTA Abd/Pelv ordered per rec from GI attending  - Patient currently hemodynamically stable  - Monitor Hgb, cbc q6h  - Patient to complete a total of 3u prbcs    Case Discussed with Dr. Ahn  Case Discussed with Dr. Kohler  Case Discussed with RN    50 mins assessing presenting problems of acute illness. Medical decision making including Initiating plan of care, reviewing data, reviewing radiology, discussing with multidisciplinary team, non inclusive of procedures, discussing goals of care with patient/family

## 2025-04-10 NOTE — PROVIDER CONTACT NOTE (CRITICAL VALUE NOTIFICATION) - ASSESSMENT
Patient is alert and oriented.  Patient experienced multiple bloody stools. NICOLLE Torres notified.

## 2025-04-10 NOTE — PROGRESS NOTE ADULT - SUBJECTIVE AND OBJECTIVE BOX
see colonoscopy report    Imp: Rectal Bleeding; Diverticulosis (s/p R sided diverticular bleed --> not bleeding at this time)    Plan: Transfuse 2nd unit prbc's; CBC q 6h; clear liquids. If bleeding persists will need CT-Angio/IR; suggest ICU evaluation

## 2025-04-11 LAB
ANION GAP SERPL CALC-SCNC: 2 MMOL/L — LOW (ref 5–17)
BLD GP AB SCN SERPL QL: SIGNIFICANT CHANGE UP
BUN SERPL-MCNC: 9 MG/DL — SIGNIFICANT CHANGE UP (ref 7–23)
CALCIUM SERPL-MCNC: 8.4 MG/DL — LOW (ref 8.5–10.1)
CHLORIDE SERPL-SCNC: 113 MMOL/L — HIGH (ref 96–108)
CO2 SERPL-SCNC: 29 MMOL/L — SIGNIFICANT CHANGE UP (ref 22–31)
CREAT SERPL-MCNC: 0.52 MG/DL — SIGNIFICANT CHANGE UP (ref 0.5–1.3)
EGFR: 97 ML/MIN/1.73M2 — SIGNIFICANT CHANGE UP
EGFR: 97 ML/MIN/1.73M2 — SIGNIFICANT CHANGE UP
GLUCOSE SERPL-MCNC: 108 MG/DL — HIGH (ref 70–99)
HCT VFR BLD CALC: 29.2 % — LOW (ref 34.5–45)
HGB BLD-MCNC: 10.3 G/DL — LOW (ref 11.5–15.5)
MAGNESIUM SERPL-MCNC: 1.5 MG/DL — LOW (ref 1.6–2.6)
MCHC RBC-ENTMCNC: 31.6 PG — SIGNIFICANT CHANGE UP (ref 27–34)
MCHC RBC-ENTMCNC: 35.3 G/DL — SIGNIFICANT CHANGE UP (ref 32–36)
MCV RBC AUTO: 89.6 FL — SIGNIFICANT CHANGE UP (ref 80–100)
NRBC BLD AUTO-RTO: 0 /100 WBCS — SIGNIFICANT CHANGE UP (ref 0–0)
PHOSPHATE SERPL-MCNC: 2.9 MG/DL — SIGNIFICANT CHANGE UP (ref 2.5–4.5)
PLATELET # BLD AUTO: 138 K/UL — LOW (ref 150–400)
POTASSIUM SERPL-MCNC: 3.7 MMOL/L — SIGNIFICANT CHANGE UP (ref 3.5–5.3)
POTASSIUM SERPL-SCNC: 3.7 MMOL/L — SIGNIFICANT CHANGE UP (ref 3.5–5.3)
RBC # BLD: 3.26 M/UL — LOW (ref 3.8–5.2)
RBC # FLD: 16.2 % — HIGH (ref 10.3–14.5)
SODIUM SERPL-SCNC: 144 MMOL/L — SIGNIFICANT CHANGE UP (ref 135–145)
WBC # BLD: 10.07 K/UL — SIGNIFICANT CHANGE UP (ref 3.8–10.5)
WBC # FLD AUTO: 10.07 K/UL — SIGNIFICANT CHANGE UP (ref 3.8–10.5)

## 2025-04-11 PROCEDURE — 99232 SBSQ HOSP IP/OBS MODERATE 35: CPT

## 2025-04-11 RX ORDER — MAGNESIUM SULFATE 500 MG/ML
1 SYRINGE (ML) INJECTION ONCE
Refills: 0 | Status: COMPLETED | OUTPATIENT
Start: 2025-04-11 | End: 2025-04-11

## 2025-04-11 RX ADMIN — Medication 325 MILLIGRAM(S): at 13:21

## 2025-04-11 RX ADMIN — Medication 650 MILLIGRAM(S): at 18:34

## 2025-04-11 RX ADMIN — SODIUM CHLORIDE 125 MILLILITER(S): 9 INJECTION, SOLUTION INTRAVENOUS at 06:05

## 2025-04-11 RX ADMIN — ATORVASTATIN CALCIUM 20 MILLIGRAM(S): 80 TABLET, FILM COATED ORAL at 21:28

## 2025-04-11 RX ADMIN — Medication 100 GRAM(S): at 08:52

## 2025-04-11 RX ADMIN — Medication 0.5 MILLIGRAM(S): at 21:28

## 2025-04-11 RX ADMIN — Medication 40 MILLIGRAM(S): at 06:01

## 2025-04-11 RX ADMIN — Medication 650 MILLIGRAM(S): at 20:12

## 2025-04-11 NOTE — PROGRESS NOTE ADULT - SUBJECTIVE AND OBJECTIVE BOX
Anoop Ridley M.D.    Patient is a 74y old  Female who presents with a chief complaint of lower GI bleed (11 Apr 2025 11:39)      SUBJECTIVE / OVERNIGHT EVENTS: continues to report BRBPR overnight.     Patient denies chest pain, SOB, fever, chills, dysuria or any other complaints. All remainder ROS negative.     MEDICATIONS  (STANDING):  atorvastatin 20 milliGRAM(s) Oral at bedtime  bisacodyl 10 milliGRAM(s) Oral once  ferrous    sulfate 325 milliGRAM(s) Oral daily  lactated ringers. 1000 milliLiter(s) (125 mL/Hr) IV Continuous <Continuous>  LORazepam     Tablet 0.5 milliGRAM(s) Oral at bedtime  pantoprazole    Tablet 40 milliGRAM(s) Oral before breakfast    MEDICATIONS  (PRN):  acetaminophen     Tablet .. 650 milliGRAM(s) Oral every 6 hours PRN Mild Pain (1 - 3), Moderate Pain (4 - 6)  cyclobenzaprine 5 milliGRAM(s) Oral three times a day PRN Muscle Spasm  ondansetron Injectable 4 milliGRAM(s) IV Push every 8 hours PRN Nausea and/or Vomiting      I&O's Summary    10 Apr 2025 07:01  -  11 Apr 2025 07:00  --------------------------------------------------------  IN: 2100 mL / OUT: 0 mL / NET: 2100 mL    11 Apr 2025 07:01  -  11 Apr 2025 12:13  --------------------------------------------------------  IN: 240 mL / OUT: 2000 mL / NET: -1760 mL        PHYSICAL EXAM:  Vital Signs Last 24 Hrs  T(C): 36.9 (11 Apr 2025 10:20), Max: 37.2 (10 Apr 2025 20:10)  T(F): 98.5 (11 Apr 2025 10:20), Max: 99 (10 Apr 2025 20:10)  HR: 88 (11 Apr 2025 10:20) (77 - 102)  BP: 121/77 (11 Apr 2025 10:20) (100/65 - 128/79)  BP(mean): --  RR: 17 (11 Apr 2025 10:20) (16 - 18)  SpO2: 97% (11 Apr 2025 10:20) (97% - 99%)    Parameters below as of 10 Apr 2025 20:10  Patient On (Oxygen Delivery Method): room air    CONSTITUTIONAL: NAD, well-groomed  RESPIRATORY: Normal respiratory effort; lungs are clear to auscultation bilaterally  CARDIOVASCULAR: Regular rate and rhythm, no LE edema  ABDOMEN: Nontender to palpation, normoactive bowel sounds    LABS:                        10.3   10.07 )-----------( 138      ( 11 Apr 2025 07:00 )             29.2     04-11    144  |  113[H]  |  9   ----------------------------<  108[H]  3.7   |  29  |  0.52    Ca    8.4[L]      11 Apr 2025 07:00  Phos  2.9     04-11  Mg     1.5     04-11    TPro  5.3[L]  /  Alb  2.8[L]  /  TBili  0.7  /  DBili  x   /  AST  14[L]  /  ALT  11[L]  /  AlkPhos  45  04-10          Urinalysis Basic - ( 11 Apr 2025 07:00 )    Color: x / Appearance: x / SG: x / pH: x  Gluc: 108 mg/dL / Ketone: x  / Bili: x / Urobili: x   Blood: x / Protein: x / Nitrite: x   Leuk Esterase: x / RBC: x / WBC x   Sq Epi: x / Non Sq Epi: x / Bacteria: x        CAPILLARY BLOOD GLUCOSE          RADIOLOGY & ADDITIONAL TESTS:  Results Reviewed:   Imaging Personally Reviewed:  Electrocardiogram Personally Reviewed:

## 2025-04-11 NOTE — DIETITIAN INITIAL EVALUATION ADULT - DIET TYPE
advance diet as tolerated to full liquid, lactose restricted->easy to chew, low fiber, lactose restricted,

## 2025-04-11 NOTE — DIETITIAN INITIAL EVALUATION ADULT - OTHER INFO
Pt appears to be tolerating clear fluid diet.  Pt s/p colonoscopy 04/10, acute blood loss anemia secondary to lower GI bleed as per chart review.  Diet progression per GI noted.  Pt encouraged to have regular meal times @ home, encouraged adequate protein-energy intake, the use of supplements as per GI tolerance discussed.

## 2025-04-11 NOTE — DIETITIAN INITIAL EVALUATION ADULT - NS FNS WEIGHT CHANGE REASON
Per pt., her recent usual wt. was 135 LBS, used to weigh 175 LBS ~ 10 years ago and lost weight gradually as advised by MD .  Per pt., she was ~ 125 LBS PTA.  Per adm wt. 04/09, 58.4 kg/128.7 LBS, wt. loss of ~ 6 LBS is noted from reported usual wt./unintentional Per pt., her recent usual wt. was 135 LBS, used to weigh 175 LBS ~ 10 years ago and lost weight gradually as advised by MD .  Per pt., she was ~ 125 LBS PTA.  Height: 5' 1/2"   Per adm wt. 04/09, 58.4 kg/128.7 LBS, wt. loss of ~ 6 LBS is noted from reported usual wt./unintentional

## 2025-04-11 NOTE — DIETITIAN NUTRITION RISK NOTIFICATION - TREATMENT: THE FOLLOWING DIET HAS BEEN RECOMMENDED
Diet, Clear Liquid (04-09-25 @ 11:08) [Active]  Advance diet as tolerated to full liquid, lactose restricted->easy to chew, low fiber, lactose restricted  +Ensure Plus High Protein Therapeutic Nutrition 8oz 2 x day=700 calories, 40 grams protein   OR  [employer] 1.0, 11 oz(325 ml), 2 x day=650 calories, 32 grams protein

## 2025-04-11 NOTE — DIETITIAN INITIAL EVALUATION ADULT - ETIOLOGY
inadequate protein-energy intake in setting of history of alteration in GI function; IBS, colon resection

## 2025-04-11 NOTE — DIETITIAN INITIAL EVALUATION ADULT - ORAL NUTRITION SUPPLEMENTS
Ensure Plus High Protein Therapeutic Nutrition 8oz 2 x day=700 calories, 40 grams protein or [employer] 1.0, 11 oz(325 ml), 2 x day=650 calories, 32 grams protein

## 2025-04-11 NOTE — PROGRESS NOTE ADULT - SUBJECTIVE AND OBJECTIVE BOX
Pt transfused 3u prbc's yesterday  Pt likely with R sided diverticular bleed  CT Angio neg  Bleeding appears to have stopped today      MEDICATIONS  (STANDING):  atorvastatin 20 milliGRAM(s) Oral at bedtime  bisacodyl 10 milliGRAM(s) Oral once  ferrous    sulfate 325 milliGRAM(s) Oral daily  lactated ringers. 1000 milliLiter(s) (125 mL/Hr) IV Continuous <Continuous>  LORazepam     Tablet 0.5 milliGRAM(s) Oral at bedtime  pantoprazole    Tablet 40 milliGRAM(s) Oral before breakfast    MEDICATIONS  (PRN):  acetaminophen     Tablet .. 650 milliGRAM(s) Oral every 6 hours PRN Mild Pain (1 - 3), Moderate Pain (4 - 6)  cyclobenzaprine 5 milliGRAM(s) Oral three times a day PRN Muscle Spasm  ondansetron Injectable 4 milliGRAM(s) IV Push every 8 hours PRN Nausea and/or Vomiting      Allergies    Bactrim (Hives)  trimethoprim (Unknown)  dust (Hives)  sulfa drugs (Unknown)  sulfadoxine (Hives)    Intolerances        Vital Signs Last 24 Hrs  T(C): 36.9 (11 Apr 2025 10:20), Max: 37.2 (10 Apr 2025 20:10)  T(F): 98.5 (11 Apr 2025 10:20), Max: 99 (10 Apr 2025 20:10)  HR: 88 (11 Apr 2025 10:20) (77 - 102)  BP: 121/77 (11 Apr 2025 10:20) (100/65 - 128/79)  BP(mean): --  RR: 17 (11 Apr 2025 10:20) (16 - 18)  SpO2: 97% (11 Apr 2025 10:20) (97% - 99%)    Parameters below as of 10 Apr 2025 20:10  Patient On (Oxygen Delivery Method): room air        PHYSICAL EXAM:  General: NAD.  CVS: S1, S2  Chest: air entry bilaterally present  Abd: BS present, soft, non-tender      LABS:                        10.3   10.07 )-----------( 138      ( 11 Apr 2025 07:00 )             29.2     04-11    144  |  113[H]  |  9   ----------------------------<  108[H]  3.7   |  29  |  0.52    Ca    8.4[L]      11 Apr 2025 07:00  Phos  2.9     04-11  Mg     1.5     04-11    TPro  5.3[L]  /  Alb  2.8[L]  /  TBili  0.7  /  DBili  x   /  AST  14[L]  /  ALT  11[L]  /  AlkPhos  45  04-10        on clear liquids  will continue to monitor CBC  If no further bleeding will advance diet         Pt transfused 3u prbc's yesterday  Pt likely with R sided diverticular bleed  CT Angio neg  Bleeding appears to have slowed down although pt noted BRB overnight and this morning      MEDICATIONS  (STANDING):  atorvastatin 20 milliGRAM(s) Oral at bedtime  bisacodyl 10 milliGRAM(s) Oral once  ferrous    sulfate 325 milliGRAM(s) Oral daily  lactated ringers. 1000 milliLiter(s) (125 mL/Hr) IV Continuous <Continuous>  LORazepam     Tablet 0.5 milliGRAM(s) Oral at bedtime  pantoprazole    Tablet 40 milliGRAM(s) Oral before breakfast    MEDICATIONS  (PRN):  acetaminophen     Tablet .. 650 milliGRAM(s) Oral every 6 hours PRN Mild Pain (1 - 3), Moderate Pain (4 - 6)  cyclobenzaprine 5 milliGRAM(s) Oral three times a day PRN Muscle Spasm  ondansetron Injectable 4 milliGRAM(s) IV Push every 8 hours PRN Nausea and/or Vomiting      Allergies    Bactrim (Hives)  trimethoprim (Unknown)  dust (Hives)  sulfa drugs (Unknown)  sulfadoxine (Hives)    Intolerances        Vital Signs Last 24 Hrs  T(C): 36.9 (11 Apr 2025 10:20), Max: 37.2 (10 Apr 2025 20:10)  T(F): 98.5 (11 Apr 2025 10:20), Max: 99 (10 Apr 2025 20:10)  HR: 88 (11 Apr 2025 10:20) (77 - 102)  BP: 121/77 (11 Apr 2025 10:20) (100/65 - 128/79)  BP(mean): --  RR: 17 (11 Apr 2025 10:20) (16 - 18)  SpO2: 97% (11 Apr 2025 10:20) (97% - 99%)    Parameters below as of 10 Apr 2025 20:10  Patient On (Oxygen Delivery Method): room air        PHYSICAL EXAM:  General: NAD.  CVS: S1, S2  Chest: air entry bilaterally present  Abd: BS present, soft, non-tender      LABS:                        10.3   10.07 )-----------( 138      ( 11 Apr 2025 07:00 )             29.2     04-11    144  |  113[H]  |  9   ----------------------------<  108[H]  3.7   |  29  |  0.52    Ca    8.4[L]      11 Apr 2025 07:00  Phos  2.9     04-11  Mg     1.5     04-11    TPro  5.3[L]  /  Alb  2.8[L]  /  TBili  0.7  /  DBili  x   /  AST  14[L]  /  ALT  11[L]  /  AlkPhos  45  04-10    CT Angio Abdomen and Pelvis w/ IV Cont (04.10.25 @ 15:57) >  IMPRESSION: No GI bleed.    on clear liquids  will continue to monitor CBC  If no further bleeding will advance diet

## 2025-04-11 NOTE — DIETITIAN INITIAL EVALUATION ADULT - PERTINENT MEDS FT
MEDICATIONS  (STANDING):  atorvastatin 20 milliGRAM(s) Oral at bedtime  bisacodyl 10 milliGRAM(s) Oral once  ferrous    sulfate 325 milliGRAM(s) Oral daily  lactated ringers. 1000 milliLiter(s) (125 mL/Hr) IV Continuous <Continuous>  LORazepam     Tablet 0.5 milliGRAM(s) Oral at bedtime  pantoprazole    Tablet 40 milliGRAM(s) Oral before breakfast    MEDICATIONS  (PRN):  acetaminophen     Tablet .. 650 milliGRAM(s) Oral every 6 hours PRN Mild Pain (1 - 3), Moderate Pain (4 - 6)  cyclobenzaprine 5 milliGRAM(s) Oral three times a day PRN Muscle Spasm  ondansetron Injectable 4 milliGRAM(s) IV Push every 8 hours PRN Nausea and/or Vomiting

## 2025-04-11 NOTE — DIETITIAN INITIAL EVALUATION ADULT - ORAL INTAKE PTA/DIET HISTORY
Pt reports depressed appetite for 1 week due to recent c/o GI bleed c inadequate intake.  Pt reports that she generally eats 1 meal/day due to work schedule, c history of IBS, colon resection in 2009.  Pt did her own shopping/cooking.  Pt c intolerance to lactose, use of Lactaid pill or Lactaid milk reported.  Pt c allergy to sunflower seed c reaction of wheezing.  Pt has some chewing difficulty as she is awaiting to get dental work done, ate easy to chew/soft foods.

## 2025-04-11 NOTE — DIETITIAN INITIAL EVALUATION ADULT - PERTINENT LABORATORY DATA
04-11    144  |  113[H]  |  9   ----------------------------<  108[H]  3.7   |  29  |  0.52    Ca    8.4[L]      11 Apr 2025 07:00  Phos  2.9     04-11  Mg     1.5     04-11    TPro  5.3[L]  /  Alb  2.8[L]  /  TBili  0.7  /  DBili  x   /  AST  14[L]  /  ALT  11[L]  /  AlkPhos  45  04-10

## 2025-04-12 LAB
ANION GAP SERPL CALC-SCNC: 4 MMOL/L — LOW (ref 5–17)
BUN SERPL-MCNC: 11 MG/DL — SIGNIFICANT CHANGE UP (ref 7–23)
CALCIUM SERPL-MCNC: 8.2 MG/DL — LOW (ref 8.5–10.1)
CHLORIDE SERPL-SCNC: 112 MMOL/L — HIGH (ref 96–108)
CO2 SERPL-SCNC: 28 MMOL/L — SIGNIFICANT CHANGE UP (ref 22–31)
CREAT SERPL-MCNC: 0.39 MG/DL — LOW (ref 0.5–1.3)
EGFR: 104 ML/MIN/1.73M2 — SIGNIFICANT CHANGE UP
EGFR: 104 ML/MIN/1.73M2 — SIGNIFICANT CHANGE UP
GLUCOSE SERPL-MCNC: 87 MG/DL — SIGNIFICANT CHANGE UP (ref 70–99)
HCT VFR BLD CALC: 27.2 % — LOW (ref 34.5–45)
HCT VFR BLD CALC: 27.7 % — LOW (ref 34.5–45)
HCT VFR BLD CALC: 30.4 % — LOW (ref 34.5–45)
HGB BLD-MCNC: 10.5 G/DL — LOW (ref 11.5–15.5)
HGB BLD-MCNC: 9.5 G/DL — LOW (ref 11.5–15.5)
HGB BLD-MCNC: 9.7 G/DL — LOW (ref 11.5–15.5)
MCHC RBC-ENTMCNC: 31.4 PG — SIGNIFICANT CHANGE UP (ref 27–34)
MCHC RBC-ENTMCNC: 31.8 PG — SIGNIFICANT CHANGE UP (ref 27–34)
MCHC RBC-ENTMCNC: 32 PG — SIGNIFICANT CHANGE UP (ref 27–34)
MCHC RBC-ENTMCNC: 34.5 G/DL — SIGNIFICANT CHANGE UP (ref 32–36)
MCHC RBC-ENTMCNC: 34.9 G/DL — SIGNIFICANT CHANGE UP (ref 32–36)
MCHC RBC-ENTMCNC: 35 G/DL — SIGNIFICANT CHANGE UP (ref 32–36)
MCV RBC AUTO: 90.8 FL — SIGNIFICANT CHANGE UP (ref 80–100)
MCV RBC AUTO: 91 FL — SIGNIFICANT CHANGE UP (ref 80–100)
MCV RBC AUTO: 91.6 FL — SIGNIFICANT CHANGE UP (ref 80–100)
NRBC BLD AUTO-RTO: 0 /100 WBCS — SIGNIFICANT CHANGE UP (ref 0–0)
PLATELET # BLD AUTO: 143 K/UL — LOW (ref 150–400)
PLATELET # BLD AUTO: 161 K/UL — SIGNIFICANT CHANGE UP (ref 150–400)
PLATELET # BLD AUTO: 172 K/UL — SIGNIFICANT CHANGE UP (ref 150–400)
POTASSIUM SERPL-MCNC: 3.3 MMOL/L — LOW (ref 3.5–5.3)
POTASSIUM SERPL-SCNC: 3.3 MMOL/L — LOW (ref 3.5–5.3)
RBC # BLD: 2.97 M/UL — LOW (ref 3.8–5.2)
RBC # BLD: 3.05 M/UL — LOW (ref 3.8–5.2)
RBC # BLD: 3.34 M/UL — LOW (ref 3.8–5.2)
RBC # FLD: 15.7 % — HIGH (ref 10.3–14.5)
RBC # FLD: 15.7 % — HIGH (ref 10.3–14.5)
RBC # FLD: 15.9 % — HIGH (ref 10.3–14.5)
SODIUM SERPL-SCNC: 144 MMOL/L — SIGNIFICANT CHANGE UP (ref 135–145)
WBC # BLD: 10.63 K/UL — HIGH (ref 3.8–10.5)
WBC # BLD: 10.71 K/UL — HIGH (ref 3.8–10.5)
WBC # BLD: 6.82 K/UL — SIGNIFICANT CHANGE UP (ref 3.8–10.5)
WBC # FLD AUTO: 10.63 K/UL — HIGH (ref 3.8–10.5)
WBC # FLD AUTO: 10.71 K/UL — HIGH (ref 3.8–10.5)
WBC # FLD AUTO: 6.82 K/UL — SIGNIFICANT CHANGE UP (ref 3.8–10.5)

## 2025-04-12 PROCEDURE — 99233 SBSQ HOSP IP/OBS HIGH 50: CPT

## 2025-04-12 RX ADMIN — Medication 40 MILLIGRAM(S): at 05:33

## 2025-04-12 RX ADMIN — Medication 325 MILLIGRAM(S): at 12:23

## 2025-04-12 RX ADMIN — Medication 40 MILLIEQUIVALENT(S): at 14:57

## 2025-04-12 RX ADMIN — Medication 0.5 MILLIGRAM(S): at 22:43

## 2025-04-12 RX ADMIN — Medication 40 MILLIEQUIVALENT(S): at 12:24

## 2025-04-12 RX ADMIN — ATORVASTATIN CALCIUM 20 MILLIGRAM(S): 80 TABLET, FILM COATED ORAL at 22:44

## 2025-04-12 RX ADMIN — Medication 40 MILLIEQUIVALENT(S): at 10:29

## 2025-04-12 NOTE — CHART NOTE - NSCHARTNOTEFT_GEN_A_CORE
IR consulted for possible transfer to [facility] for GI bleed.     Patient with repeated episodes of GI bleed this week.  CTA performed on 4/7 and 4/10 show no active bleeding. Suspected bleeding from right sided diverticula however no active bleeding noted on colonoscopy of 4/10. Initial concern also for small bowel angiodysplasia of the small bowel on prior admission.    Angiogram would also likely be negative in the setting of negative CTA, leading to unnecessary sedation and contrast administration. Would not perform empiric embolization in the large bowel.      There are intermittent bloody bowel movements however she remains hemodynamically stable and has not required blood products for >24 hours. No emergent indication for angiogram at this time.    Will need further localization for site of bleeding active bleeding given negative CTA and colonoscopy, prior to consideration for embolization.     If no plan for repeat colonoscopy for identification of active bleed, please obtain bleeding scan.     Please call transfer center over the weekend if clinical status changes.     - Will follow up bleeding scan.   - possible intervention with IR on Monday at [facility] pending results.

## 2025-04-12 NOTE — PROGRESS NOTE ADULT - SUBJECTIVE AND OBJECTIVE BOX
CC: lower GI bleed (12 Apr 2025 12:04)    HPI:  74 years old female with h/o HLD, GERD, anxiety, h/o diverticulitis s/p colon resection in 2009, h/o GI bleed, IBS, osteoporosis present to ED with complain of bloody stool. Patient was admitted her ( 4/7-4/8) for similar complain. CTA with no active GI bleed. Hb was stable during hospital course and patient was discharged home yesterday. Today AM, patient had one large episode of bloody stool associated with near syncope. No abdominal pain. No blood thinner use.  Hemodynamically stable, afebrile, sat well at RA. WBC 14.29, Hb 10.3, plt 191, K 3.2, Na 146, Cr 0.71, lactate 0.9. EKG with NSR (09 Apr 2025 11:28)    INTERVAL HPI/OVERNIGHT EVENTS: pt has multiple episodes of bloody bowel movement within last 24 hours.    Vital Signs Last 24 Hrs  T(C): 37.2 (12 Apr 2025 10:11), Max: 37.2 (11 Apr 2025 16:38)  T(F): 99 (12 Apr 2025 10:11), Max: 99 (11 Apr 2025 16:38)  HR: 95 (12 Apr 2025 10:11) (64 - 99)  BP: 130/84 (12 Apr 2025 10:11) (103/68 - 142/94)  BP(mean): --  RR: 18 (12 Apr 2025 10:11) (18 - 18)  SpO2: 99% (12 Apr 2025 10:11) (95% - 99%)    Parameters below as of 12 Apr 2025 10:11  Patient On (Oxygen Delivery Method): room air      I&O's Detail    11 Apr 2025 07:01  -  12 Apr 2025 07:00  --------------------------------------------------------  IN:    Oral Fluid: 780 mL  Total IN: 780 mL    OUT:    Voided (mL): 2500 mL  Total OUT: 2500 mL    Total NET: -1720 mL      12 Apr 2025 07:01  -  12 Apr 2025 16:09  --------------------------------------------------------  IN:    Oral Fluid: 740 mL  Total IN: 740 mL    OUT:  Total OUT: 0 mL    Total NET: 740 mL        REVIEW OF SYSTEMS:    CONSTITUTIONAL: No weakness, fevers or chills  EYES/ENT: No visual changes;  No vertigo or throat pain   NECK: No pain or stiffness  RESPIRATORY: No cough, wheezing, hemoptysis; No shortness of breath  CARDIOVASCULAR: No chest pain or palpitations  GASTROINTESTINAL: No abdominal or epigastric pain. No nausea, vomiting, or hematemesis; No diarrhea or constipation. No melena or hematochezia.  GENITOURINARY: No dysuria, frequency or hematuria  NEUROLOGICAL: No numbness or weakness  SKIN: No itching, burning, rashes, or lesions   All other review of systems is negative unless indicated above.    PHYSICAL EXAM:    General: in no acute distress  Eyes: PERRLA, EOMI; conjunctiva and sclera clear  Head: Normocephalic; atraumatic    Respiratory: No wheezes, rales or rhonchi  Cardiovascular: Regular rate and rhythm. S1 and S2 Normal; No murmurs, gallops or rubs  Gastrointestinal: Soft non-tender non-distended; Normal bowel sounds  Genitourinary: No  suprapubic  tenderness  Extremities: Normal range of motion, No clubbing, cyanosis or edema  Vascular: Peripheral pulses palpable 2+ bilaterally  Neurological: Alert and oriented x4  Skin: Warm and dry.  Musculoskeletal: Normal muscle tone, without deformities  Psychiatric: Cooperative and appropriate                            10.5   10.71 )-----------( 172      ( 12 Apr 2025 14:40 )             30.4     12 Apr 2025 05:46    144    |  112    |  11     ----------------------------<  87     3.3     |  28     |  0.39     Ca    8.2        12 Apr 2025 05:46  Phos  2.9       11 Apr 2025 07:00  Mg     1.5       11 Apr 2025 07:00        CAPILLARY BLOOD GLUCOSE          Urinalysis Basic - ( 12 Apr 2025 05:46 )    Color: x / Appearance: x / SG: x / pH: x  Gluc: 87 mg/dL / Ketone: x  / Bili: x / Urobili: x   Blood: x / Protein: x / Nitrite: x   Leuk Esterase: x / RBC: x / WBC x   Sq Epi: x / Non Sq Epi: x / Bacteria: x        MEDICATIONS  (STANDING):  atorvastatin 20 milliGRAM(s) Oral at bedtime  bisacodyl 10 milliGRAM(s) Oral once  ferrous    sulfate 325 milliGRAM(s) Oral daily  lactated ringers. 1000 milliLiter(s) (125 mL/Hr) IV Continuous <Continuous>  LORazepam     Tablet 0.5 milliGRAM(s) Oral at bedtime  pantoprazole    Tablet 40 milliGRAM(s) Oral before breakfast    MEDICATIONS  (PRN):  acetaminophen     Tablet .. 650 milliGRAM(s) Oral every 6 hours PRN Mild Pain (1 - 3), Moderate Pain (4 - 6)  cyclobenzaprine 5 milliGRAM(s) Oral three times a day PRN Muscle Spasm  ondansetron Injectable 4 milliGRAM(s) IV Push every 8 hours PRN Nausea and/or Vomiting      RADIOLOGY & ADDITIONAL TESTS:

## 2025-04-12 NOTE — PROGRESS NOTE ADULT - SUBJECTIVE AND OBJECTIVE BOX
Pt stable although now c/o increased LGI bleeding  Hgb this AM 9.5  Long discussion with pt   Case discussed with hospitalist --> Dr Zurita  Pt likely with diverticular bleeding from R Colon/Hepatic flexure as noted on colonoscopy a few days ago      MEDICATIONS  (STANDING):  atorvastatin 20 milliGRAM(s) Oral at bedtime  bisacodyl 10 milliGRAM(s) Oral once  ferrous    sulfate 325 milliGRAM(s) Oral daily  lactated ringers. 1000 milliLiter(s) (125 mL/Hr) IV Continuous <Continuous>  LORazepam     Tablet 0.5 milliGRAM(s) Oral at bedtime  pantoprazole    Tablet 40 milliGRAM(s) Oral before breakfast  potassium chloride    Tablet ER 40 milliEquivalent(s) Oral every 2 hours    MEDICATIONS  (PRN):  acetaminophen     Tablet .. 650 milliGRAM(s) Oral every 6 hours PRN Mild Pain (1 - 3), Moderate Pain (4 - 6)  cyclobenzaprine 5 milliGRAM(s) Oral three times a day PRN Muscle Spasm  ondansetron Injectable 4 milliGRAM(s) IV Push every 8 hours PRN Nausea and/or Vomiting      Allergies    Bactrim (Hives)  sunflower seeds (Short breath)  trimethoprim (Unknown)  dust (Hives)  sulfa drugs (Unknown)  sulfadoxine (Hives)    Intolerances    lactose (Stomach Upset)      Vital Signs Last 24 Hrs  T(C): 37.2 (12 Apr 2025 10:11), Max: 37.2 (11 Apr 2025 16:38)  T(F): 99 (12 Apr 2025 10:11), Max: 99 (11 Apr 2025 16:38)  HR: 95 (12 Apr 2025 10:11) (64 - 99)  BP: 130/84 (12 Apr 2025 10:11) (103/68 - 142/94)  BP(mean): --  RR: 18 (12 Apr 2025 10:11) (18 - 18)  SpO2: 99% (12 Apr 2025 10:11) (95% - 99%)    Parameters below as of 12 Apr 2025 10:11  Patient On (Oxygen Delivery Method): room air        PHYSICAL EXAM:  General: NAD.  CVS: S1, S2  Chest: air entry bilaterally present  Abd: BS present, soft, non-tender      LABS:                        9.5    6.82  )-----------( 143      ( 12 Apr 2025 05:46 )             27.2     04-12    144  |  112[H]  |  11  ----------------------------<  87  3.3[L]   |  28  |  0.39[L]    Ca    8.2[L]      12 Apr 2025 05:46  Phos  2.9     04-11  Mg     1.5     04-11      continue clear liquids  After discussion with hospitalist (Dr Zurita) recommend contacting [facility] for possible transfer. Pt should be in a facility with access to IR (not available at [facility])  Also, suggest surgical consultation in case bleeding becomes more active  repeat CBC at 2PM and in AM

## 2025-04-12 NOTE — CONSULT NOTE ADULT - SUBJECTIVE AND OBJECTIVE BOX
Patient is a 74y old  Female who presents with a chief complaint of lower GI bleed (12 Apr 2025 12:04)      HPI: 74F with PMHx of HLD, GERD, anxiety, diverticulitis, GI bleed, surgical history of laparoscopic sigmoid colectomy in 2009 2/2 diverticulitis, open appendectomy in 1972, cataract surgery, rhinoplasty, admitted with lower GIB. Patient endorses dizziness and weakness when standing and ambulating, reports 4x bloody BMs today. Patient is tolerating CLD but reports poor appetite. Patient reports chronic diarrhea and abdominal pain for years. Patient has history of massive GIB in 2017 with admission to ICU and total of 13uPRBC transfused during admission, patient reports after bleeding resolved GI was able to identify bleeding location on scope and coagulate. On this admission, colonoscopy performed 4/10 by Dr. Kohler, patient is s/p R sided diverticular bleed, no active bleeding noted during colonoscopy. Patient is s/p 3uPRBC on 4/10, H&H responded appropriately. Denies: fever, chills, nausea, vomiting, SOB, urinary complaints    74 years old female with h/o HLD, GERD, anxiety, h/o diverticulitis s/p colon resection in 2009, h/o GI bleed, IBS, osteoporosis present to ED with complain of bloody stool. Patient was admitted her ( 4/7-4/8) for similar complain. CTA with no active GI bleed. Hb was stable during hospital course and patient was discharged home yesterday. Today AM, patient had one large episode of bloody stool associated with near syncope. No abdominal pain. No blood thinner use.  Hemodynamically stable, afebrile, sat well at RA. WBC 14.29, Hb 10.3, plt 191, K 3.2, Na 146, Cr 0.71, lactate 0.9. EKG with NSR (09 Apr 2025 11:28)      PAST MEDICAL & SURGICAL HISTORY:  Diverticulitis      GERD (Gastroesophageal Reflux      IBS (Irritable Bowel Syndrome)      Age Related Osteoporosis      Status Post Laparoscopic-Margie      Status Post Appendectomy      Status Post Tonsillectomy          Review of Systems:  Negative except  as above in HPI    MEDICATIONS  (STANDING):  atorvastatin 20 milliGRAM(s) Oral at bedtime  bisacodyl 10 milliGRAM(s) Oral once  ferrous    sulfate 325 milliGRAM(s) Oral daily  lactated ringers. 1000 milliLiter(s) (125 mL/Hr) IV Continuous <Continuous>  LORazepam     Tablet 0.5 milliGRAM(s) Oral at bedtime  pantoprazole    Tablet 40 milliGRAM(s) Oral before breakfast    MEDICATIONS  (PRN):  acetaminophen     Tablet .. 650 milliGRAM(s) Oral every 6 hours PRN Mild Pain (1 - 3), Moderate Pain (4 - 6)  cyclobenzaprine 5 milliGRAM(s) Oral three times a day PRN Muscle Spasm  ondansetron Injectable 4 milliGRAM(s) IV Push every 8 hours PRN Nausea and/or Vomiting      Allergies    Bactrim (Hives)  sunflower seeds (Short breath)  trimethoprim (Unknown)  dust (Hives)  sulfa drugs (Unknown)  sulfadoxine (Hives)    Intolerances    lactose (Stomach Upset)            Vital Signs Last 24 Hrs  T(C): 37.2 (12 Apr 2025 10:11), Max: 37.2 (11 Apr 2025 16:38)  T(F): 99 (12 Apr 2025 10:11), Max: 99 (11 Apr 2025 16:38)  HR: 95 (12 Apr 2025 10:11) (64 - 99)  BP: 130/84 (12 Apr 2025 10:11) (103/68 - 142/94)  BP(mean): --  RR: 18 (12 Apr 2025 10:11) (18 - 18)  SpO2: 99% (12 Apr 2025 10:11) (95% - 99%)    Parameters below as of 12 Apr 2025 10:11  Patient On (Oxygen Delivery Method): room air        Physical Exam:  General:  Appears stated age, well-groomed, no distress  Eyes: EOMI, conjunctiva clear  HENT:  WNL, no JVD  Chest:  no respiratory distress, appropriate work of breathing  Cardiovascular:  S1S2  Abdomen:  soft, nondistended, mild generalized abdominal TTP  Extremities:  no pedal edema or calf tenderness noted  Skin:  No rash  Musculoskeletal:  normal strength  Neuro/Psych:  Alert, oriented to time, place and person     LABS:                        10.5   10.71 )-----------( 172      ( 12 Apr 2025 14:40 )             30.4     04-12    144  |  112[H]  |  11  ----------------------------<  87  3.3[L]   |  28  |  0.39[L]    Ca    8.2[L]      12 Apr 2025 05:46  Phos  2.9     04-11  Mg     1.5     04-11        Urinalysis Basic - ( 12 Apr 2025 05:46 )    Color: x / Appearance: x / SG: x / pH: x  Gluc: 87 mg/dL / Ketone: x  / Bili: x / Urobili: x   Blood: x / Protein: x / Nitrite: x   Leuk Esterase: x / RBC: x / WBC x   Sq Epi: x / Non Sq Epi: x / Bacteria: x          RADIOLOGY & ADDITIONAL STUDIES: < from: CT Angio Abdomen and Pelvis w/ IV Cont (04.10.25 @ 15:57) >      INTERPRETATION:  CLINICAL INFORMATION: rectal bleeding FCT    COMPARISON: 4.7.25    CONTRAST/COMPLICATIONS:  IV Contrast: Omnipaque 350  90 cc administered   10 cc discarded  Oral Contrast: NONE  .    PROCEDURE:  CT of the Abdomen and Pelvis was performed.  Precontrast, Arterial and Delayed phases were performed.  Sagittal and coronal reformats were performed.    FINDINGS:    LOWER CHEST: Within normal limits.    LIVER: Cysts and other lesions too small to characterize.  BILE DUCTS: Normal caliber.  GALLBLADDER: Within normal limits.  SPLEEN: Within normal limits.  PANCREAS: Within normal limits.  ADRENALS: Within normal limits.  KIDNEYS/URETERS: Cysts and other lesions too small to characterize.    BLADDER: Within normal limits.  REPRODUCTIVE ORGANS: Within normal limits.    BOWEL: No bowel obstruction. Rectosigmoid anastamosis.  No GI bleed.  PERITONEUM/RETROPERITONEUM: Within normal limits.  VESSELS:  Within normal limits.  LYMPH NODES: Within normal limits.  ABDOMINAL WALL: Within normal limits.  BONES: Within normal limits.    IMPRESSION: No GI bleed.    < end of copied text >      Impression/Plan: 74F with PMHx of HLD, GERD, anxiety, diverticulitis, GI bleed, surgical history of laparoscopic sigmoid colectomy in 2009 2/2 diverticulitis, open appendectomy in 1972, cataract surgery, rhinoplasty, admitted with lower GIB.   Colonoscopy performed 4/10 by Dr. Kohler, patient is s/p R sided diverticular bleed, no active bleeding noted during colonoscopy.   Patient is s/p 3uPRBC on 4/10, H&H responded appropriately.   Patient reports 4x episodes of bloody BMs today  H&H stable, vitals stable  CTA 4/10: No GI bleed    Plan as discussed with Dr. Lui:  - No acute surgical intervention, patient is hemodynamically stable, H&H is stable   - Monitor H&H, transfuse PRN  - If bleeding persists, recommend GI follow up for repeat colonoscopy  - Monitor vitals  - Will follow  - Continue care per primary team   
Full consult dictated    Plan: 73 yo F with h/o HLD, GERD, anxiety, h/o diverticulitis s/p colon resection in 2009, h/o GI bleed (last bleed was 4/7/25., IBS, osteoporosis admitted with recurrent episode of BRBPR. Pt was d/c'd home yesterday after refusing colonoscopy for today.  Pt now agrees to colonoscopy tomorrow. Prior CTA with no active GI bleed. Hb was stable during hospital course and remains stable today. Patient had 1 large episode of bloody stool associated with near syncope earlier today. No abdominal pain. No blood thinner use. Last Hgb 10.3. Pt likely with diverticular bleed. Start clear liquids; follow CBC; colon prep for colonoscopy tomorrow.

## 2025-04-13 LAB
ANION GAP SERPL CALC-SCNC: 3 MMOL/L — LOW (ref 5–17)
BUN SERPL-MCNC: 9 MG/DL — SIGNIFICANT CHANGE UP (ref 7–23)
CALCIUM SERPL-MCNC: 9 MG/DL — SIGNIFICANT CHANGE UP (ref 8.5–10.1)
CHLORIDE SERPL-SCNC: 111 MMOL/L — HIGH (ref 96–108)
CO2 SERPL-SCNC: 25 MMOL/L — SIGNIFICANT CHANGE UP (ref 22–31)
CREAT SERPL-MCNC: 0.42 MG/DL — LOW (ref 0.5–1.3)
EGFR: 102 ML/MIN/1.73M2 — SIGNIFICANT CHANGE UP
EGFR: 102 ML/MIN/1.73M2 — SIGNIFICANT CHANGE UP
GLUCOSE SERPL-MCNC: 97 MG/DL — SIGNIFICANT CHANGE UP (ref 70–99)
HCT VFR BLD CALC: 26.4 % — LOW (ref 34.5–45)
HCT VFR BLD CALC: 27.3 % — LOW (ref 34.5–45)
HCT VFR BLD CALC: 29.2 % — LOW (ref 34.5–45)
HGB BLD-MCNC: 10.2 G/DL — LOW (ref 11.5–15.5)
HGB BLD-MCNC: 9.4 G/DL — LOW (ref 11.5–15.5)
HGB BLD-MCNC: 9.5 G/DL — LOW (ref 11.5–15.5)
MAGNESIUM SERPL-MCNC: 1.8 MG/DL — SIGNIFICANT CHANGE UP (ref 1.6–2.6)
MCHC RBC-ENTMCNC: 31.9 PG — SIGNIFICANT CHANGE UP (ref 27–34)
MCHC RBC-ENTMCNC: 31.9 PG — SIGNIFICANT CHANGE UP (ref 27–34)
MCHC RBC-ENTMCNC: 32.1 PG — SIGNIFICANT CHANGE UP (ref 27–34)
MCHC RBC-ENTMCNC: 34.8 G/DL — SIGNIFICANT CHANGE UP (ref 32–36)
MCHC RBC-ENTMCNC: 34.9 G/DL — SIGNIFICANT CHANGE UP (ref 32–36)
MCHC RBC-ENTMCNC: 35.6 G/DL — SIGNIFICANT CHANGE UP (ref 32–36)
MCV RBC AUTO: 90.1 FL — SIGNIFICANT CHANGE UP (ref 80–100)
MCV RBC AUTO: 91.3 FL — SIGNIFICANT CHANGE UP (ref 80–100)
MCV RBC AUTO: 91.6 FL — SIGNIFICANT CHANGE UP (ref 80–100)
NRBC BLD AUTO-RTO: 0 /100 WBCS — SIGNIFICANT CHANGE UP (ref 0–0)
PHOSPHATE SERPL-MCNC: 3.6 MG/DL — SIGNIFICANT CHANGE UP (ref 2.5–4.5)
PLATELET # BLD AUTO: 156 K/UL — SIGNIFICANT CHANGE UP (ref 150–400)
PLATELET # BLD AUTO: 159 K/UL — SIGNIFICANT CHANGE UP (ref 150–400)
PLATELET # BLD AUTO: 201 K/UL — SIGNIFICANT CHANGE UP (ref 150–400)
POTASSIUM SERPL-MCNC: 4.1 MMOL/L — SIGNIFICANT CHANGE UP (ref 3.5–5.3)
POTASSIUM SERPL-SCNC: 4.1 MMOL/L — SIGNIFICANT CHANGE UP (ref 3.5–5.3)
RBC # BLD: 2.93 M/UL — LOW (ref 3.8–5.2)
RBC # BLD: 2.98 M/UL — LOW (ref 3.8–5.2)
RBC # BLD: 3.2 M/UL — LOW (ref 3.8–5.2)
RBC # FLD: 15.7 % — HIGH (ref 10.3–14.5)
RBC # FLD: 15.8 % — HIGH (ref 10.3–14.5)
RBC # FLD: 15.8 % — HIGH (ref 10.3–14.5)
SODIUM SERPL-SCNC: 139 MMOL/L — SIGNIFICANT CHANGE UP (ref 135–145)
WBC # BLD: 10.63 K/UL — HIGH (ref 3.8–10.5)
WBC # BLD: 8.57 K/UL — SIGNIFICANT CHANGE UP (ref 3.8–10.5)
WBC # BLD: 9.47 K/UL — SIGNIFICANT CHANGE UP (ref 3.8–10.5)
WBC # FLD AUTO: 10.63 K/UL — HIGH (ref 3.8–10.5)
WBC # FLD AUTO: 8.57 K/UL — SIGNIFICANT CHANGE UP (ref 3.8–10.5)
WBC # FLD AUTO: 9.47 K/UL — SIGNIFICANT CHANGE UP (ref 3.8–10.5)

## 2025-04-13 PROCEDURE — 99233 SBSQ HOSP IP/OBS HIGH 50: CPT

## 2025-04-13 PROCEDURE — 99223 1ST HOSP IP/OBS HIGH 75: CPT | Mod: FS

## 2025-04-13 RX ADMIN — Medication 650 MILLIGRAM(S): at 22:00

## 2025-04-13 RX ADMIN — Medication 325 MILLIGRAM(S): at 13:36

## 2025-04-13 RX ADMIN — ATORVASTATIN CALCIUM 20 MILLIGRAM(S): 80 TABLET, FILM COATED ORAL at 21:22

## 2025-04-13 RX ADMIN — Medication 650 MILLIGRAM(S): at 21:22

## 2025-04-13 RX ADMIN — Medication 0.5 MILLIGRAM(S): at 21:22

## 2025-04-13 RX ADMIN — Medication 40 MILLIGRAM(S): at 06:09

## 2025-04-13 NOTE — PROGRESS NOTE ADULT - SUBJECTIVE AND OBJECTIVE BOX
Pt continues with intermittent rectal bleeding  H/H remains stable  Appreciate Surgical consult and f/u  Pt scheduled for NM bleeding scan tomorrow      MEDICATIONS  (STANDING):  atorvastatin 20 milliGRAM(s) Oral at bedtime  bisacodyl 10 milliGRAM(s) Oral once  ferrous    sulfate 325 milliGRAM(s) Oral daily  lactated ringers. 1000 milliLiter(s) (125 mL/Hr) IV Continuous <Continuous>  LORazepam     Tablet 0.5 milliGRAM(s) Oral at bedtime  pantoprazole    Tablet 40 milliGRAM(s) Oral before breakfast    MEDICATIONS  (PRN):  acetaminophen     Tablet .. 650 milliGRAM(s) Oral every 6 hours PRN Mild Pain (1 - 3), Moderate Pain (4 - 6)  cyclobenzaprine 5 milliGRAM(s) Oral three times a day PRN Muscle Spasm  ondansetron Injectable 4 milliGRAM(s) IV Push every 8 hours PRN Nausea and/or Vomiting      Allergies    Bactrim (Hives)  sunflower seeds (Short breath)  trimethoprim (Unknown)  dust (Hives)  sulfa drugs (Unknown)  sulfadoxine (Hives)    Intolerances    lactose (Stomach Upset)      Vital Signs Last 24 Hrs  T(C): 37.2 (13 Apr 2025 11:31), Max: 37.2 (13 Apr 2025 11:31)  T(F): 98.9 (13 Apr 2025 11:31), Max: 98.9 (13 Apr 2025 11:31)  HR: 97 (13 Apr 2025 11:31) (90 - 102)  BP: 122/80 (13 Apr 2025 11:31) (103/72 - 122/80)  BP(mean): --  RR: 18 (13 Apr 2025 11:31) (17 - 18)  SpO2: 97% (13 Apr 2025 11:31) (97% - 99%)    Parameters below as of 13 Apr 2025 05:11  Patient On (Oxygen Delivery Method): room air        PHYSICAL EXAM:  General: NAD.  CVS: S1, S2  Chest: air entry bilaterally present  Abd: BS present, soft, non-tender      LABS:                        9.5    8.57  )-----------( 159      ( 13 Apr 2025 07:12 )             27.3     04-13    139  |  111[H]  |  9   ----------------------------<  97  4.1   |  25  |  0.42[L]    Ca    9.0      13 Apr 2025 07:12  Phos  3.6     04-13  Mg     1.8     04-13        Case discussed with family and hospitalist  Dr Baker to cover GI 4/14-20/ Pt is on the transfer list for [facility] ifbleeding continues  If NM bleeding scan is negative would consider repeat colonoscopy.        Yes

## 2025-04-13 NOTE — PROGRESS NOTE ADULT - SUBJECTIVE AND OBJECTIVE BOX
CC: lower GI bleed (13 Apr 2025 10:34)    HPI:  74 years old female with h/o HLD, GERD, anxiety, h/o diverticulitis s/p colon resection in 2009, h/o GI bleed, IBS, osteoporosis present to ED with complain of bloody stool. Patient was admitted her ( 4/7-4/8) for similar complain. CTA with no active GI bleed. Hb was stable during hospital course and patient was discharged home yesterday. Today AM, patient had one large episode of bloody stool associated with near syncope. No abdominal pain. No blood thinner use.  Hemodynamically stable, afebrile, sat well at RA. WBC 14.29, Hb 10.3, plt 191, K 3.2, Na 146, Cr 0.71, lactate 0.9. EKG with NSR (09 Apr 2025 11:28)    INTERVAL HPI/OVERNIGHT EVENTS:    Vital Signs Last 24 Hrs  T(C): 37.2 (13 Apr 2025 11:31), Max: 37.2 (13 Apr 2025 11:31)  T(F): 98.9 (13 Apr 2025 11:31), Max: 98.9 (13 Apr 2025 11:31)  HR: 97 (13 Apr 2025 11:31) (90 - 102)  BP: 122/80 (13 Apr 2025 11:31) (103/72 - 122/80)  BP(mean): --  RR: 18 (13 Apr 2025 11:31) (17 - 18)  SpO2: 97% (13 Apr 2025 11:31) (97% - 99%)    Parameters below as of 13 Apr 2025 05:11  Patient On (Oxygen Delivery Method): room air      I&O's Detail    12 Apr 2025 07:01  -  13 Apr 2025 07:00  --------------------------------------------------------  IN:    Oral Fluid: 890 mL  Total IN: 890 mL    OUT:  Total OUT: 0 mL    Total NET: 890 mL        REVIEW OF SYSTEMS:    CONSTITUTIONAL: No weakness, fevers or chills  EYES/ENT: No visual changes;  No vertigo or throat pain   NECK: No pain or stiffness  RESPIRATORY: No cough, wheezing, hemoptysis; No shortness of breath  CARDIOVASCULAR: No chest pain or palpitations  GASTROINTESTINAL: No abdominal or epigastric pain. No nausea, vomiting, or hematemesis; No diarrhea or constipation. No melena or hematochezia.  GENITOURINARY: No dysuria, frequency or hematuria  NEUROLOGICAL: No numbness or weakness  SKIN: No itching, burning, rashes, or lesions   All other review of systems is negative unless indicated above.    PHYSICAL EXAM:    General: in no acute distress  Eyes: PERRLA, EOMI; conjunctiva and sclera clear  Head: Normocephalic; atraumatic    Respiratory: No wheezes, rales or rhonchi  Cardiovascular: Regular rate and rhythm. S1 and S2 Normal; No murmurs, gallops or rubs  Gastrointestinal: Soft non-tender non-distended; Normal bowel sounds    Extremities: Normal range of motion, No clubbing, cyanosis or edema  Vascular: Peripheral pulses palpable 2+ bilaterally  Neurological: Alert and oriented x4  Skin: Warm and dry.   Musculoskeletal: Normal muscle tone, without deformities  Psychiatric: Cooperative and appropriate                            9.5    8.57  )-----------( 159      ( 13 Apr 2025 07:12 )             27.3     13 Apr 2025 07:12    139    |  111    |  9      ----------------------------<  97     4.1     |  25     |  0.42     Ca    9.0        13 Apr 2025 07:12  Phos  3.6       13 Apr 2025 07:12  Mg     1.8       13 Apr 2025 07:12        CAPILLARY BLOOD GLUCOSE          Urinalysis Basic - ( 13 Apr 2025 07:12 )    Color: x / Appearance: x / SG: x / pH: x  Gluc: 97 mg/dL / Ketone: x  / Bili: x / Urobili: x   Blood: x / Protein: x / Nitrite: x   Leuk Esterase: x / RBC: x / WBC x   Sq Epi: x / Non Sq Epi: x / Bacteria: x        MEDICATIONS  (STANDING):  atorvastatin 20 milliGRAM(s) Oral at bedtime  bisacodyl 10 milliGRAM(s) Oral once  ferrous    sulfate 325 milliGRAM(s) Oral daily  lactated ringers. 1000 milliLiter(s) (125 mL/Hr) IV Continuous <Continuous>  LORazepam     Tablet 0.5 milliGRAM(s) Oral at bedtime  pantoprazole    Tablet 40 milliGRAM(s) Oral before breakfast    MEDICATIONS  (PRN):  acetaminophen     Tablet .. 650 milliGRAM(s) Oral every 6 hours PRN Mild Pain (1 - 3), Moderate Pain (4 - 6)  cyclobenzaprine 5 milliGRAM(s) Oral three times a day PRN Muscle Spasm  ondansetron Injectable 4 milliGRAM(s) IV Push every 8 hours PRN Nausea and/or Vomiting      RADIOLOGY & ADDITIONAL TESTS:

## 2025-04-13 NOTE — PROGRESS NOTE ADULT - SUBJECTIVE AND OBJECTIVE BOX
Patient seen and examined bedside resting comfortably.  +2 bloody bms today, 6 total yesterday  Denies nausea, vomiting, diarrhea, fevers, chills. Tolerating clear liquid diet.  ambulating without difficulty       T(F): 98.5 (04-13-25 @ 05:11), Max: 98.7 (04-13-25 @ 00:54)  HR: 92 (04-13-25 @ 05:11) (92 - 102)  BP: 107/73 (04-13-25 @ 05:11) (103/72 - 112/74)  RR: 17 (04-13-25 @ 05:11) (17 - 18)  SpO2: 97% (04-13-25 @ 05:11) (97% - 99%)  Wt(kg): --  CAPILLARY BLOOD GLUCOSE          PHYSICAL EXAM:  General: NAD  Neuro:  Alert & oriented x 3  HEENT: NCAT, EOMI, conjunctiva clear  Lung:respirations nonlabored, good inspiratory effort  Abdomen: soft, NTND.   Extremities: no pedal edema or calf tenderness noted     LABS:                        9.5    8.57  )-----------( 159      ( 13 Apr 2025 07:12 )             27.3     04-13    139  |  111[H]  |  9   ----------------------------<  97  4.1   |  25  |  0.42[L]    Ca    9.0      13 Apr 2025 07:12  Phos  3.6     04-13  Mg     1.8     04-13          I&O:             A/P: 74F with PMHx of HLD, GERD, anxiety, diverticulitis, GI bleed, surgical history of laparoscopic sigmoid colectomy in 2009 2/2 diverticulitis, open appendectomy in 1972 admitted with ABLA 2/2 lower GIB.   Patient is s/p 3uPRBC on 4/10. 4/10 CTA no active bleed  s/p Colonoscopy 4/10 by Dr. Kohler, patient is s/p R sided diverticular bleed, no active bleeding noted during colonoscopy.   pt with continued bloody bms. h/h remains stable, not requiring any further blood transfusions  -f/u NM scan ordered by primary team, if + recommend IR consult  -continue to trend h/h, transfuse prn  -if pt continues to bleed recommend rpt colonoscopy by GI  -no acute surgical intervention  -case discussed with Dr Lui, will continue to follow

## 2025-04-14 LAB
ANION GAP SERPL CALC-SCNC: 5 MMOL/L — SIGNIFICANT CHANGE UP (ref 5–17)
BUN SERPL-MCNC: 7 MG/DL — SIGNIFICANT CHANGE UP (ref 7–23)
CALCIUM SERPL-MCNC: 9.1 MG/DL — SIGNIFICANT CHANGE UP (ref 8.5–10.1)
CHLORIDE SERPL-SCNC: 109 MMOL/L — HIGH (ref 96–108)
CO2 SERPL-SCNC: 28 MMOL/L — SIGNIFICANT CHANGE UP (ref 22–31)
CREAT SERPL-MCNC: 0.52 MG/DL — SIGNIFICANT CHANGE UP (ref 0.5–1.3)
EGFR: 97 ML/MIN/1.73M2 — SIGNIFICANT CHANGE UP
EGFR: 97 ML/MIN/1.73M2 — SIGNIFICANT CHANGE UP
GLUCOSE SERPL-MCNC: 101 MG/DL — HIGH (ref 70–99)
HCT VFR BLD CALC: 28.8 % — LOW (ref 34.5–45)
HGB BLD-MCNC: 9.8 G/DL — LOW (ref 11.5–15.5)
MCHC RBC-ENTMCNC: 31.6 PG — SIGNIFICANT CHANGE UP (ref 27–34)
MCHC RBC-ENTMCNC: 34 G/DL — SIGNIFICANT CHANGE UP (ref 32–36)
MCV RBC AUTO: 92.9 FL — SIGNIFICANT CHANGE UP (ref 80–100)
NRBC BLD AUTO-RTO: 0 /100 WBCS — SIGNIFICANT CHANGE UP (ref 0–0)
PLATELET # BLD AUTO: 187 K/UL — SIGNIFICANT CHANGE UP (ref 150–400)
POTASSIUM SERPL-MCNC: 4.2 MMOL/L — SIGNIFICANT CHANGE UP (ref 3.5–5.3)
POTASSIUM SERPL-SCNC: 4.2 MMOL/L — SIGNIFICANT CHANGE UP (ref 3.5–5.3)
RBC # BLD: 3.1 M/UL — LOW (ref 3.8–5.2)
RBC # FLD: 16.4 % — HIGH (ref 10.3–14.5)
SODIUM SERPL-SCNC: 142 MMOL/L — SIGNIFICANT CHANGE UP (ref 135–145)
WBC # BLD: 7.86 K/UL — SIGNIFICANT CHANGE UP (ref 3.8–10.5)
WBC # FLD AUTO: 7.86 K/UL — SIGNIFICANT CHANGE UP (ref 3.8–10.5)

## 2025-04-14 PROCEDURE — 99233 SBSQ HOSP IP/OBS HIGH 50: CPT

## 2025-04-14 PROCEDURE — 99232 SBSQ HOSP IP/OBS MODERATE 35: CPT

## 2025-04-14 PROCEDURE — 78278 ACUTE GI BLOOD LOSS IMAGING: CPT | Mod: 26

## 2025-04-14 RX ADMIN — Medication 650 MILLIGRAM(S): at 21:17

## 2025-04-14 RX ADMIN — ATORVASTATIN CALCIUM 20 MILLIGRAM(S): 80 TABLET, FILM COATED ORAL at 21:17

## 2025-04-14 RX ADMIN — Medication 0.5 MILLIGRAM(S): at 21:17

## 2025-04-14 RX ADMIN — Medication 650 MILLIGRAM(S): at 22:17

## 2025-04-14 RX ADMIN — Medication 40 MILLIGRAM(S): at 06:07

## 2025-04-14 NOTE — PROGRESS NOTE ADULT - SUBJECTIVE AND OBJECTIVE BOX
Patient seen and examined bedside resting comfortably.  No complaints offered. no further bloody bms  Denies nausea, vomiting, diarrhea, fevers, chills. Tolerating clear liquid diet.  ambulating without difficulty       T(F): 98.2 (04-14-25 @ 05:15), Max: 98.9 (04-13-25 @ 11:31)  HR: 87 (04-14-25 @ 05:15) (83 - 110)  BP: 105/72 (04-14-25 @ 05:15) (105/72 - 122/80)  RR: 18 (04-14-25 @ 05:15) (17 - 18)  SpO2: 98% (04-14-25 @ 05:15) (94% - 98%)  Wt(kg): --  CAPILLARY BLOOD GLUCOSE          PHYSICAL EXAM:  General: NAD  Neuro:  Alert & oriented x 3  HEENT: NCAT, EOMI, conjunctiva clear  Lung:respirations nonlabored, good inspiratory effort  Abdomen: soft, NTND.   Extremities: no pedal edema or calf tenderness noted     LABS:                        9.8    7.86  )-----------( 187      ( 14 Apr 2025 07:43 )             28.8     04-14    142  |  109[H]  |  7   ----------------------------<  101[H]  4.2   |  28  |  0.52    Ca    9.1      14 Apr 2025 07:43  Phos  3.6     04-13  Mg     1.8     04-13          I&O:       A/P: 74F with PMHx of HLD, GERD, anxiety, diverticulitis, GI bleed, surgical history of laparoscopic sigmoid colectomy in 2009 2/2 diverticulitis, open appendectomy in 1972 admitted with ABLA 2/2 lower GIB.   Patient is s/p 3uPRBC on 4/10. 4/10 CTA no active bleed  s/p Colonoscopy 4/10 by Dr. Kohler, patient is s/p R sided diverticular bleed, no active bleeding noted during colonoscopy.   no further bloody bms since yesterday am. h/h remains stable, not requiring any further blood transfusions  -f/u NM scan   -continue to trend h/h, transfuse prn  -if pt continues to bleed recommend rpt colonoscopy by GI  -no acute surgical intervention

## 2025-04-14 NOTE — PROGRESS NOTE ADULT - NS ATTEND AMEND GEN_ALL_CORE FT
Pt seen and examined in AM. Pt states she had a BM this AM w/o any noticeable blood. H/h from AM labs stable. Will continue to monitor labs and BMs for signs of bleed. At least 50 minutes spent on reviewing this patient's chart and treating the patient.
Patient seen and examined. Agree with above.  Ongoing lower GI bleed with stable hemodynamics and labs. Agree with nuclear bleeding scan, reeval by GI and eval by IR tomorrow. Will continue to follow.

## 2025-04-14 NOTE — CHART NOTE - NSCHARTNOTEFT_GEN_A_CORE
IR follow up note     Bleeding scan performed without evidence of active bleed. No target for embolization. IR to sign off for now.     Reconsult IR as needed.

## 2025-04-14 NOTE — PROGRESS NOTE ADULT - SUBJECTIVE AND OBJECTIVE BOX
Lewis Kaur MD  [facility] Division of Hospital Medicine    SUBJECTIVE / OVERNIGHT EVENTS:  - no events overnight, no n/v/abd pain/cough/chest pain. feels lousy but otherwise has no acute complaints.     MEDICATIONS  (STANDING):  atorvastatin 20 milliGRAM(s) Oral at bedtime  bisacodyl 10 milliGRAM(s) Oral once  ferrous    sulfate 325 milliGRAM(s) Oral daily  LORazepam     Tablet 0.5 milliGRAM(s) Oral at bedtime  pantoprazole    Tablet 40 milliGRAM(s) Oral before breakfast    MEDICATIONS  (PRN):  acetaminophen     Tablet .. 650 milliGRAM(s) Oral every 6 hours PRN Mild Pain (1 - 3), Moderate Pain (4 - 6)  cyclobenzaprine 5 milliGRAM(s) Oral three times a day PRN Muscle Spasm  ondansetron Injectable 4 milliGRAM(s) IV Push every 8 hours PRN Nausea and/or Vomiting      I&O's Summary      PHYSICAL EXAM:  Vital Signs Last 24 Hrs  T(C): 36.4 (14 Apr 2025 11:14), Max: 36.8 (13 Apr 2025 23:59)  T(F): 97.6 (14 Apr 2025 11:14), Max: 98.2 (13 Apr 2025 23:59)  HR: 92 (14 Apr 2025 11:14) (77 - 92)  BP: 101/63 (14 Apr 2025 11:14) (101/63 - 106/70)  BP(mean): --  RR: 17 (14 Apr 2025 11:14) (17 - 18)  SpO2: 96% (14 Apr 2025 11:14) (94% - 98%)    Parameters below as of 14 Apr 2025 05:15  Patient On (Oxygen Delivery Method): room air      CONSTITUTIONAL: NAD, well-developed, well-groomed  RESPIRATORY: Normal respiratory effort; lungs are clear to auscultation bilaterally  CARDIOVASCULAR: Regular rate and rhythm, normal S1 and S2, no murmur/rub/gallop; No lower extremity edema; Peripheral pulses are 2+ bilaterally  ABDOMEN: Nontender to palpation, normoactive bowel sounds, no rebound/guarding;   MUSCULOSKELETAL: no clubbing or cyanosis of digits; no joint swelling or tenderness to palpation  NEUROLOGY: CN 2-12 are intact and symmetric; no gross sensory deficits   SKIN: No rashes; no palpable lesions    LABS:                        9.8    7.86  )-----------( 187      ( 14 Apr 2025 07:43 )             28.8     04-14    142  |  109[H]  |  7   ----------------------------<  101[H]  4.2   |  28  |  0.52    Ca    9.1      14 Apr 2025 07:43  Phos  3.6     04-13  Mg     1.8     04-13            Urinalysis Basic - ( 14 Apr 2025 07:43 )    Color: x / Appearance: x / SG: x / pH: x  Gluc: 101 mg/dL / Ketone: x  / Bili: x / Urobili: x   Blood: x / Protein: x / Nitrite: x   Leuk Esterase: x / RBC: x / WBC x   Sq Epi: x / Non Sq Epi: x / Bacteria: x

## 2025-04-15 LAB
ALBUMIN SERPL ELPH-MCNC: 3.3 G/DL — SIGNIFICANT CHANGE UP (ref 3.3–5)
ALP SERPL-CCNC: 58 U/L — SIGNIFICANT CHANGE UP (ref 40–120)
ALT FLD-CCNC: 15 U/L — SIGNIFICANT CHANGE UP (ref 12–78)
ANION GAP SERPL CALC-SCNC: 7 MMOL/L — SIGNIFICANT CHANGE UP (ref 5–17)
AST SERPL-CCNC: 10 U/L — LOW (ref 15–37)
BASOPHILS # BLD AUTO: 0.03 K/UL — SIGNIFICANT CHANGE UP (ref 0–0.2)
BASOPHILS NFR BLD AUTO: 0.4 % — SIGNIFICANT CHANGE UP (ref 0–2)
BILIRUB SERPL-MCNC: 0.9 MG/DL — SIGNIFICANT CHANGE UP (ref 0.2–1.2)
BUN SERPL-MCNC: 10 MG/DL — SIGNIFICANT CHANGE UP (ref 7–23)
CALCIUM SERPL-MCNC: 9.3 MG/DL — SIGNIFICANT CHANGE UP (ref 8.5–10.1)
CHLORIDE SERPL-SCNC: 106 MMOL/L — SIGNIFICANT CHANGE UP (ref 96–108)
CO2 SERPL-SCNC: 28 MMOL/L — SIGNIFICANT CHANGE UP (ref 22–31)
CREAT SERPL-MCNC: 0.5 MG/DL — SIGNIFICANT CHANGE UP (ref 0.5–1.3)
EGFR: 98 ML/MIN/1.73M2 — SIGNIFICANT CHANGE UP
EGFR: 98 ML/MIN/1.73M2 — SIGNIFICANT CHANGE UP
EOSINOPHIL # BLD AUTO: 0.04 K/UL — SIGNIFICANT CHANGE UP (ref 0–0.5)
EOSINOPHIL NFR BLD AUTO: 0.5 % — SIGNIFICANT CHANGE UP (ref 0–6)
GLUCOSE SERPL-MCNC: 94 MG/DL — SIGNIFICANT CHANGE UP (ref 70–99)
HCT VFR BLD CALC: 31.7 % — LOW (ref 34.5–45)
HGB BLD-MCNC: 10.8 G/DL — LOW (ref 11.5–15.5)
IMM GRANULOCYTES NFR BLD AUTO: 1 % — HIGH (ref 0–0.9)
LYMPHOCYTES # BLD AUTO: 1.51 K/UL — SIGNIFICANT CHANGE UP (ref 1–3.3)
LYMPHOCYTES # BLD AUTO: 19.3 % — SIGNIFICANT CHANGE UP (ref 13–44)
MAGNESIUM SERPL-MCNC: 1.5 MG/DL — LOW (ref 1.6–2.6)
MCHC RBC-ENTMCNC: 32.2 PG — SIGNIFICANT CHANGE UP (ref 27–34)
MCHC RBC-ENTMCNC: 34.1 G/DL — SIGNIFICANT CHANGE UP (ref 32–36)
MCV RBC AUTO: 94.6 FL — SIGNIFICANT CHANGE UP (ref 80–100)
MONOCYTES # BLD AUTO: 1 K/UL — HIGH (ref 0–0.9)
MONOCYTES NFR BLD AUTO: 12.8 % — SIGNIFICANT CHANGE UP (ref 2–14)
NEUTROPHILS # BLD AUTO: 5.18 K/UL — SIGNIFICANT CHANGE UP (ref 1.8–7.4)
NEUTROPHILS NFR BLD AUTO: 66 % — SIGNIFICANT CHANGE UP (ref 43–77)
NRBC BLD AUTO-RTO: 0 /100 WBCS — SIGNIFICANT CHANGE UP (ref 0–0)
PHOSPHATE SERPL-MCNC: 4 MG/DL — SIGNIFICANT CHANGE UP (ref 2.5–4.5)
PLATELET # BLD AUTO: 225 K/UL — SIGNIFICANT CHANGE UP (ref 150–400)
POTASSIUM SERPL-MCNC: 3.4 MMOL/L — LOW (ref 3.5–5.3)
POTASSIUM SERPL-SCNC: 3.4 MMOL/L — LOW (ref 3.5–5.3)
PROT SERPL-MCNC: 6.3 GM/DL — SIGNIFICANT CHANGE UP (ref 6–8.3)
RBC # BLD: 3.35 M/UL — LOW (ref 3.8–5.2)
RBC # FLD: 16.3 % — HIGH (ref 10.3–14.5)
SODIUM SERPL-SCNC: 141 MMOL/L — SIGNIFICANT CHANGE UP (ref 135–145)
WBC # BLD: 7.84 K/UL — SIGNIFICANT CHANGE UP (ref 3.8–10.5)
WBC # FLD AUTO: 7.84 K/UL — SIGNIFICANT CHANGE UP (ref 3.8–10.5)

## 2025-04-15 PROCEDURE — 99232 SBSQ HOSP IP/OBS MODERATE 35: CPT

## 2025-04-15 RX ORDER — MAGNESIUM SULFATE 500 MG/ML
2 SYRINGE (ML) INJECTION ONCE
Refills: 0 | Status: COMPLETED | OUTPATIENT
Start: 2025-04-15 | End: 2025-04-15

## 2025-04-15 RX ORDER — ONDANSETRON HCL/PF 4 MG/2 ML
4 VIAL (ML) INJECTION ONCE
Refills: 0 | Status: COMPLETED | OUTPATIENT
Start: 2025-04-15 | End: 2025-04-15

## 2025-04-15 RX ADMIN — Medication 40 MILLIGRAM(S): at 05:44

## 2025-04-15 RX ADMIN — Medication 25 GRAM(S): at 10:56

## 2025-04-15 RX ADMIN — ATORVASTATIN CALCIUM 20 MILLIGRAM(S): 80 TABLET, FILM COATED ORAL at 22:08

## 2025-04-15 RX ADMIN — Medication 20 MILLIEQUIVALENT(S): at 10:01

## 2025-04-15 RX ADMIN — Medication 325 MILLIGRAM(S): at 11:17

## 2025-04-15 RX ADMIN — Medication 20 MILLIEQUIVALENT(S): at 11:16

## 2025-04-15 RX ADMIN — Medication 0.5 MILLIGRAM(S): at 22:08

## 2025-04-15 RX ADMIN — Medication 4 MILLIGRAM(S): at 17:34

## 2025-04-15 NOTE — PHYSICAL THERAPY INITIAL EVALUATION ADULT - PERTINENT HX OF CURRENT PROBLEM, REHAB EVAL
Per H&P, Pt is a 74 year old female with h/o HLD, GERD, anxiety, h/o diverticulitis s/p colon resection in 2009, h/o GI bleed, IBS, osteoporosis present to ED with complain of bloody stool. Patient was admitted her ( 4/7-4/8) for similar complain. CTA with no active GI bleed. Hb was stable during hospital course and patient was discharged home yesterday. Today AM, patient had one large episode of bloody stool associated with near syncope. No abdominal pain. No blood thinner use.  Hemodynamically stable, afebrile, sat well at RA. WBC 14.29, Hb 10.3, plt 191, K 3.2, Na 146, Cr 0.71, lactate 0.9. EKG with NSR

## 2025-04-15 NOTE — PROGRESS NOTE ADULT - SUBJECTIVE AND OBJECTIVE BOX
Ms. Drake is comfortable in bed  NO nausea, vomiting, fever or chills  No chest pain, no shortness of breath  No blood per rectum    ICU Vital Signs Last 24 Hrs  T(C): 36.6 (15 Apr 2025 05:03), Max: 36.8 (14 Apr 2025 23:19)  T(F): 97.8 (15 Apr 2025 05:03), Max: 98.3 (14 Apr 2025 23:19)  HR: 94 (15 Apr 2025 05:03) (86 - 94)  BP: 111/72 (15 Apr 2025 05:03) (101/63 - 111/72)  BP(mean): --  ABP: --  ABP(mean): --  RR: 18 (15 Apr 2025 05:03) (17 - 18)  SpO2: 95% (15 Apr 2025 05:03) (95% - 96%)    On exam: awake, alert and oriented  Breathing comfortably on room air; no cough  Abd is soft, not tender and not distended  No rebound, no guarding                          10.8   7.84  )-----------( 225      ( 15 Apr 2025 07:20 )             31.7     History of colectomy in 2009 with Dr. Mark Davis

## 2025-04-15 NOTE — PHYSICAL THERAPY INITIAL EVALUATION ADULT - LEVEL OF CONSCIOUSNESS, REHAB EVAL
Render In Strict Bullet Format?: No Initiate Treatment: Dazaveidaoxia: Apply to the face once daily. Detail Level: Zone alert

## 2025-04-15 NOTE — PHYSICAL THERAPY INITIAL EVALUATION ADULT - ADDITIONAL COMMENTS
Pt states she was fully independent prior. Pt lives with room mate, 5 steps to enter, 12 steps to bed room. 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

## 2025-04-15 NOTE — PHYSICAL THERAPY INITIAL EVALUATION ADULT - PHYSICAL ASSIST/NONPHYSICAL ASSIST: SIT/STAND, REHAB EVAL
PHYSICAL THERAPY DAILY NOTE    PT Individual Minutes  Time In: 1030  Time Out: 1115  Minutes: 45                 Total Treatment Time:  45 Minutes    Pt. Seen for: AM, Therapeutic Exercise, Transfer Training, and Wheelchair mobility     Subjective: Pt. Reports the ramp installation was delayed by one day due to the weather.         Objective:  Restrictions/Precautions: Fall Risk, Weight Bearing  Required Braces or Orthoses?: No   Right Lower Extremity Weight Bearing: Weight Bearing As Tolerated          Other Position/Activity Restrictions: WBAT for transfers only; static standing         GROSS ASSESSMENT   Pt. Up in w/c upon arrival.        COGNITION Daily Assessment        Overall Cognitive Status: WFL        BED/MAT MOBILITY Daily Assessment     Supine to Sit: Modified independent  Sit to Supine: Modified independent        TRANSFERS Daily Assessment    Sit to Stand: Modified independent  Stand to Sit: Modified independent  Bed to Chair: Supervision (w/ RW)          GAIT Daily Assessment     N/A       STEPS/STAIRS Daily Assessment     N/A         BALANCE Daily Assessment    Static Sitting: Good:  Pt. able to maintain balance w/o UE support;  exhibits some postural sway  Dynamic Sitting: Good - accepts moderate challenge;  can maintain balance while picking object off the floor  Static Standing: Good:  Pt. able to maintain balance w/o UE support;  exhibits some postural sway  Dynamic Standing: Fair - accepts minimal challenge;  can maintain balance while turning head/trunk       WHEELCHAIR MOBILITY Daily Assessment   Pt. Requires cues for recall of legrest management Left Leg Rest Level of Assistance: Supervision  Right Leg Rest Level of Assistance: Supervision  Left Brakes Level of Assistance: Modified independent  Right Brakes Level of Assistance: Modified independent                     LOWER EXTREMITY EXERCISES   Pt. Performed supine LE exercises to increase strength & ROM     SUPINE EXERCISES Sets Reps  supervision

## 2025-04-15 NOTE — PROGRESS NOTE ADULT - SUBJECTIVE AND OBJECTIVE BOX
Patient had two loose bm without blood  Bleeding scan negative    T(C): 36.8 (04-15-25 @ 15:57), Max: 36.8 (04-14-25 @ 23:19)  HR: 102 (04-15-25 @ 15:57) (82 - 102)  BP: 108/64 (04-15-25 @ 15:57) (101/67 - 111/75)  RR: 18 (04-15-25 @ 15:57) (18 - 18)  SpO2: 95% (04-15-25 @ 15:57) (95% - 96%)    NAD                          10.8   7.84  )-----------( 225      ( 15 Apr 2025 07:20 )             31.7     04-15    141  |  106  |  10  ----------------------------<  94  3.4[L]   |  28  |  0.50    Ca    9.3      15 Apr 2025 07:20  Phos  4.0     04-15  Mg     1.5     04-15    TPro  6.3  /  Alb  3.3  /  TBili  0.9  /  DBili  x   /  AST  10[L]  /  ALT  15  /  AlkPhos  58  04-15    Impression: s/p likely diverticular bleed. Has stopped bleeding. Bleeding scan negative with normal bm's.    No GI contraindication to discharge.    Discussed with hospitalist via Teams

## 2025-04-15 NOTE — PROGRESS NOTE ADULT - SUBJECTIVE AND OBJECTIVE BOX
Lewis Kaur MD  [facility] Division of Hospital Medicine    SUBJECTIVE / OVERNIGHT EVENTS:  - no events overnight, had regular bowel movements again. no blood in urine, no blood in stool. no cp, no cough, no LE edema, no sob.     MEDICATIONS  (STANDING):  atorvastatin 20 milliGRAM(s) Oral at bedtime  bisacodyl 10 milliGRAM(s) Oral once  ferrous    sulfate 325 milliGRAM(s) Oral daily  LORazepam     Tablet 0.5 milliGRAM(s) Oral at bedtime  pantoprazole    Tablet 40 milliGRAM(s) Oral before breakfast    MEDICATIONS  (PRN):  acetaminophen     Tablet .. 650 milliGRAM(s) Oral every 6 hours PRN Mild Pain (1 - 3), Moderate Pain (4 - 6)  cyclobenzaprine 5 milliGRAM(s) Oral three times a day PRN Muscle Spasm  ondansetron Injectable 4 milliGRAM(s) IV Push every 8 hours PRN Nausea and/or Vomiting      I&O's Summary    14 Apr 2025 07:01  -  15 Apr 2025 07:00  --------------------------------------------------------  IN: 0 mL / OUT: 1150 mL / NET: -1150 mL        PHYSICAL EXAM:  Vital Signs Last 24 Hrs  T(C): 36.4 (15 Apr 2025 11:21), Max: 36.8 (14 Apr 2025 23:19)  T(F): 97.6 (15 Apr 2025 11:21), Max: 98.3 (14 Apr 2025 23:19)  HR: 82 (15 Apr 2025 11:21) (82 - 94)  BP: 111/75 (15 Apr 2025 11:21) (101/67 - 111/75)  BP(mean): --  RR: 18 (15 Apr 2025 11:21) (18 - 18)  SpO2: 96% (15 Apr 2025 11:21) (95% - 96%)      CONSTITUTIONAL: NAD, well-developed, well-groomed  RESPIRATORY: Normal respiratory effort; lungs are clear to auscultation bilaterally  CARDIOVASCULAR: Regular rate and rhythm, normal S1 and S2, no murmur/rub/gallop; No lower extremity edema; Peripheral pulses are 2+ bilaterally  ABDOMEN: Nontender to palpation, normoactive bowel sounds, no rebound/guarding;   MUSCULOSKELETAL: no clubbing or cyanosis of digits; no joint swelling or tenderness to palpation  NEUROLOGY: CN 2-12 are intact and symmetric; no gross sensory deficits   SKIN: No rashes; no palpable lesions    LABS:                        10.8   7.84  )-----------( 225      ( 15 Apr 2025 07:20 )             31.7     04-15    141  |  106  |  10  ----------------------------<  94  3.4[L]   |  28  |  0.50    Ca    9.3      15 Apr 2025 07:20  Phos  4.0     04-15  Mg     1.5     04-15    TPro  6.3  /  Alb  3.3  /  TBili  0.9  /  DBili  x   /  AST  10[L]  /  ALT  15  /  AlkPhos  58  04-15          Urinalysis Basic - ( 15 Apr 2025 07:20 )    Color: x / Appearance: x / SG: x / pH: x  Gluc: 94 mg/dL / Ketone: x  / Bili: x / Urobili: x   Blood: x / Protein: x / Nitrite: x   Leuk Esterase: x / RBC: x / WBC x   Sq Epi: x / Non Sq Epi: x / Bacteria: x

## 2025-04-16 LAB
ALBUMIN SERPL ELPH-MCNC: 3.1 G/DL — LOW (ref 3.3–5)
ALP SERPL-CCNC: 56 U/L — SIGNIFICANT CHANGE UP (ref 40–120)
ALT FLD-CCNC: 17 U/L — SIGNIFICANT CHANGE UP (ref 12–78)
ANION GAP SERPL CALC-SCNC: 6 MMOL/L — SIGNIFICANT CHANGE UP (ref 5–17)
AST SERPL-CCNC: 9 U/L — LOW (ref 15–37)
BASOPHILS # BLD AUTO: 0.04 K/UL — SIGNIFICANT CHANGE UP (ref 0–0.2)
BASOPHILS NFR BLD AUTO: 0.5 % — SIGNIFICANT CHANGE UP (ref 0–2)
BILIRUB SERPL-MCNC: 0.6 MG/DL — SIGNIFICANT CHANGE UP (ref 0.2–1.2)
BUN SERPL-MCNC: 16 MG/DL — SIGNIFICANT CHANGE UP (ref 7–23)
CALCIUM SERPL-MCNC: 9 MG/DL — SIGNIFICANT CHANGE UP (ref 8.5–10.1)
CHLORIDE SERPL-SCNC: 108 MMOL/L — SIGNIFICANT CHANGE UP (ref 96–108)
CO2 SERPL-SCNC: 27 MMOL/L — SIGNIFICANT CHANGE UP (ref 22–31)
CREAT SERPL-MCNC: 0.57 MG/DL — SIGNIFICANT CHANGE UP (ref 0.5–1.3)
EGFR: 95 ML/MIN/1.73M2 — SIGNIFICANT CHANGE UP
EGFR: 95 ML/MIN/1.73M2 — SIGNIFICANT CHANGE UP
EOSINOPHIL # BLD AUTO: 0.04 K/UL — SIGNIFICANT CHANGE UP (ref 0–0.5)
EOSINOPHIL NFR BLD AUTO: 0.5 % — SIGNIFICANT CHANGE UP (ref 0–6)
GLUCOSE SERPL-MCNC: 129 MG/DL — HIGH (ref 70–99)
HCT VFR BLD CALC: 30.3 % — LOW (ref 34.5–45)
HGB BLD-MCNC: 10.3 G/DL — LOW (ref 11.5–15.5)
IMM GRANULOCYTES NFR BLD AUTO: 1 % — HIGH (ref 0–0.9)
LYMPHOCYTES # BLD AUTO: 1.19 K/UL — SIGNIFICANT CHANGE UP (ref 1–3.3)
LYMPHOCYTES # BLD AUTO: 14.2 % — SIGNIFICANT CHANGE UP (ref 13–44)
MAGNESIUM SERPL-MCNC: 2 MG/DL — SIGNIFICANT CHANGE UP (ref 1.6–2.6)
MCHC RBC-ENTMCNC: 32.2 PG — SIGNIFICANT CHANGE UP (ref 27–34)
MCHC RBC-ENTMCNC: 34 G/DL — SIGNIFICANT CHANGE UP (ref 32–36)
MCV RBC AUTO: 94.7 FL — SIGNIFICANT CHANGE UP (ref 80–100)
MONOCYTES # BLD AUTO: 1.02 K/UL — HIGH (ref 0–0.9)
MONOCYTES NFR BLD AUTO: 12.2 % — SIGNIFICANT CHANGE UP (ref 2–14)
NEUTROPHILS # BLD AUTO: 6.01 K/UL — SIGNIFICANT CHANGE UP (ref 1.8–7.4)
NEUTROPHILS NFR BLD AUTO: 71.6 % — SIGNIFICANT CHANGE UP (ref 43–77)
NRBC BLD AUTO-RTO: 0 /100 WBCS — SIGNIFICANT CHANGE UP (ref 0–0)
PHOSPHATE SERPL-MCNC: 2.9 MG/DL — SIGNIFICANT CHANGE UP (ref 2.5–4.5)
PLATELET # BLD AUTO: 230 K/UL — SIGNIFICANT CHANGE UP (ref 150–400)
POTASSIUM SERPL-MCNC: 3.4 MMOL/L — LOW (ref 3.5–5.3)
POTASSIUM SERPL-SCNC: 3.4 MMOL/L — LOW (ref 3.5–5.3)
PROT SERPL-MCNC: 6.1 GM/DL — SIGNIFICANT CHANGE UP (ref 6–8.3)
RBC # BLD: 3.2 M/UL — LOW (ref 3.8–5.2)
RBC # FLD: 16.6 % — HIGH (ref 10.3–14.5)
SODIUM SERPL-SCNC: 141 MMOL/L — SIGNIFICANT CHANGE UP (ref 135–145)
WBC # BLD: 8.38 K/UL — SIGNIFICANT CHANGE UP (ref 3.8–10.5)
WBC # FLD AUTO: 8.38 K/UL — SIGNIFICANT CHANGE UP (ref 3.8–10.5)

## 2025-04-16 PROCEDURE — 99232 SBSQ HOSP IP/OBS MODERATE 35: CPT

## 2025-04-16 RX ADMIN — Medication 20 MILLIEQUIVALENT(S): at 11:00

## 2025-04-16 RX ADMIN — Medication 40 MILLIGRAM(S): at 05:44

## 2025-04-16 RX ADMIN — Medication 325 MILLIGRAM(S): at 11:29

## 2025-04-16 RX ADMIN — ATORVASTATIN CALCIUM 20 MILLIGRAM(S): 80 TABLET, FILM COATED ORAL at 21:39

## 2025-04-16 RX ADMIN — Medication 0.5 MILLIGRAM(S): at 21:39

## 2025-04-16 RX ADMIN — Medication 20 MILLIEQUIVALENT(S): at 09:00

## 2025-04-16 NOTE — PROGRESS NOTE ADULT - ASSESSMENT
74 year old woman with resolving GI bleed; H/H stable, last transfusion 5 days ago  - no surgical intervention required at this time  - continue supportive care per primary team  - will sign off, please reconsult as needed
74 years old female with h/o HLD, GERD, anxiety, h/o diverticulitis s/p colon resection in 2009, h/o GI bleed, IBS, osteoporosis present to ED with complain of bloody stool. Patient was admitted her ( 4/7-4/8) for similar complain. CTA with no active GI bleed. Hb was stable during hospital course and patient was discharged home yesterday. Today AM, patient had one large episode of bloody stool associated with near syncope. No abdominal pain. No blood thinner use. Hemodynamically stable, afebrile, sat well at RA. WBC 14.29, Hb 10.3, plt 191, K 3.2, Na 146, Cr 0.71, lactate 0.9. EKG with NSR  Admitted for persistent GIB requiring transfusion.       #Acute blood loss anemia 2/2 lower GI bleed  bloody bowel movement  Recieved  3u prbc this admission  trend CBC, active T&S  status post colonoscopy today no active bleeding noted  negative CT angio  serial HG  diet advancement per GI  GI evaluation appreciated   D/W Dr Kohler   recommended transfer to [facility] for IR intervention - transfer center was contacted , awaiting transfer     #HLD  simvastatin 40mg at home    #GERD   oral PPI    #Anxiety disorder  on lorazepam 0.5mg hs    DVT ppx: SCD  Diet: Clear liquid  Dispo: pending diet advancement and GI clearance     
74 years old female with h/o HLD, GERD, anxiety, h/o diverticulitis s/p colon resection in 2009, h/o GI bleed, IBS, osteoporosis present to ED with complain of bloody stool. Patient was admitted her ( 4/7-4/8) for similar complain. CTA with no active GI bleed. Hb was stable during hospital course and patient was discharged home yesterday. Today AM, patient had one large episode of bloody stool associated with near syncope. No abdominal pain. No blood thinner use. Hemodynamically stable, afebrile, sat well at RA. WBC 14.29, Hb 10.3, plt 191, K 3.2, Na 146, Cr 0.71, lactate 0.9. EKG with NSR  Admitted for persistent GIB requiring transfusion.       #Acute blood loss anemia 2/2 lower GI bleed  bloody bowel movement  Recieved  3u prbc this admission  trend CBC, active T&S  status post colonoscopy today no active bleeding noted  negative CT angio  serial HG  diet advancement per GI  GI evaluation appreciated   D/W Dr Kohler   recommended transfer to [facility] for IR intervention - transfer center was contacted , awaiting transfer   IR and Surgery  consult appreciated   Awaiting Bleeding scan .     #HLD- continue with atorvastatin   simvastatin 40mg at home    #GERD   oral PPI    #Anxiety disorder  on lorazepam 0.5mg hs    DVT ppx: SCD  Diet: Clear liquid  Dispo: pending diet advancement and GI clearance     discussed with FAWAD CARBONE. 
74 years old female with h/o HLD, GERD, anxiety, h/o diverticulitis s/p colon resection in 2009, h/o GI bleed, IBS, osteoporosis present to ED with complain of bloody stool. Patient was admitted her ( 4/7-4/8) for similar complain. CTA with no active GI bleed. Hb was stable during hospital course and patient was discharged home yesterday. Today AM, patient had one large episode of bloody stool associated with near syncope. No abdominal pain. No blood thinner use. Hemodynamically stable, afebrile, sat well at RA. WBC 14.29, Hb 10.3, plt 191, K 3.2, Na 146, Cr 0.71, lactate 0.9. EKG with NSR  Admitted for persistent GIB requiring transfusion.       #Acute blood loss anemia 2/2 lower GI bleed  bloody bowel movement  sp 3u prbc this admission  trend CBC, active T&S  status post colonoscopy today no active bleeding noted  negative CT angio  serial HG  diet advancement per GI  GI recs angie    #HLD  simvastatin 40mg at home    #GERD   oral PPI    #Anxiety disorder  on lorazepam 0.5mg hs    DVT ppx: SCD  Diet: Clear liquid  Dispo: pending diet advancement and GI clearance     
74 years old female with h/o HLD, GERD, anxiety, h/o diverticulitis s/p colon resection in 2009, h/o GI bleed, IBS, osteoporosis present to ED with complain of bloody stool. Patient was admitted her ( 4/7-4/8) for similar complain. CTA with no active GI bleed, with stable Hb and discharged later that day, and now presents with large episode of bloody stool associated with near syncope, Admitted for persistent GIB requiring transfusion.     #Acute blood loss anemia 2/2 lower GI bleed  -bloody bowel movement  -Recieved  3u prbc this admission  -status post colonoscopy today no active bleeding noted  -negative CT angio  -Awaiting Bleeding scan - being done on 4/14 - no active bleed - IR and surgery signed off  -trial regular diet now  -PT consult given severe debility, pt feels unsteady     #HLD- continue with atorvastatin   simvastatin 40mg at home    #GERD   oral PPI    #Anxiety disorder  on lorazepam 0.5mg hs    DVT ppx: SCD  Diet: regular  Dispo: -PT consult given severe debility, pt feels unsteady. hoping to dc 4/16  
74 years old female with h/o HLD, GERD, anxiety, h/o diverticulitis s/p colon resection in 2009, h/o GI bleed, IBS, osteoporosis present to ED with complain of bloody stool. Patient was admitted her ( 4/7-4/8) for similar complain. CTA with no active GI bleed. Hb was stable during hospital course and patient was discharged home yesterday. Today AM, patient had one large episode of bloody stool associated with near syncope. No abdominal pain. No blood thinner use. Hemodynamically stable, afebrile, sat well at RA. WBC 14.29, Hb 10.3, plt 191, K 3.2, Na 146, Cr 0.71, lactate 0.9. EKG with NSR  Admitted for persistent GIB requiring transfusion.       #Acute blood loss anemia 2/2 lower GI bleed  bloody bowel movement  Recieved  3u prbc this admission  trend CBC, active T&S  status post colonoscopy today no active bleeding noted  negative CT angio  serial HG  diet advancement per GI  GI evaluation appreciated   D/W Dr Kohler   IR and Surgery  consult appreciated   Awaiting Bleeding scan - being done on 4/14    #HLD- continue with atorvastatin   simvastatin 40mg at home    #GERD   oral PPI    #Anxiety disorder  on lorazepam 0.5mg hs    DVT ppx: SCD  Diet: Clear liquid  Dispo: pending nuclear scan   
74 years old female with h/o HLD, GERD, anxiety, h/o diverticulitis s/p colon resection in 2009, h/o GI bleed, IBS, osteoporosis present to ED with complain of bloody stool. Patient was admitted her ( 4/7-4/8) for similar complain. CTA with no active GI bleed, with stable Hb and discharged later that day, and now presents with large episode of bloody stool associated with near syncope, Admitted for persistent GIB requiring transfusion.     #Acute blood loss anemia 2/2 lower GI bleed  -bloody bowel movement  -Received  3u prbc this admission  -status post colonoscopy w/no active bleeding noted  -negative CT angio  -Bleeding scan done on 4/14 - no active bleed - IR and surgery signed off  -tolerating regular diet now  -PT consult given severe debility, pt feels unsteady - recs tila - pt amenable now as she cannot even walk up the stairs     #HLD- continue with atorvastatin   simvastatin 40mg at home    #GERD   oral PPI    #Anxiety disorder  on lorazepam 0.5mg hs    DVT ppx: SCD  Diet: regular  Dispo: -PT recs tila - medically cleared for tila   
74 years old female with h/o HLD, GERD, anxiety, h/o diverticulitis s/p colon resection in 2009, h/o GI bleed, IBS, osteoporosis present to ED with complain of bloody stool. Patient was admitted her ( 4/7-4/8) for similar complain. CTA with no active GI bleed. Hb was stable during hospital course and patient was discharged home yesterday. Today AM, patient had one large episode of bloody stool associated with near syncope. No abdominal pain. No blood thinner use.  Hemodynamically stable, afebrile, sat well at RA. WBC 14.29, Hb 10.3, plt 191, K 3.2, Na 146, Cr 0.71, lactate 0.9. EKG with NSR      Patient status post colonoscopy  this am by GI right sided bleeding     three units of PRBC ordered  and received today    continued bleeding with negative CT angio   if significant bleeding may require ICU consult

## 2025-04-16 NOTE — PROGRESS NOTE ADULT - PROVIDER SPECIALTY LIST ADULT
Gastroenterology
Hospitalist
Gastroenterology
Hospitalist
Surgery
Hospitalist

## 2025-04-16 NOTE — PROGRESS NOTE ADULT - TIME BILLING
- Ordering, reviewing, and interpreting labs, testing, and imaging.  - Independently obtaining a review of systems and performing a physical exam  - Reviewing consultant documentation/recommendations in addition to discussing plan of care with consultants.  - Counselling and educating patient and family regarding interpretation of aforementioned items and plan of care.
The necessity of the time spent during the encounter on this date of service was due to:   - Ordering, reviewing, and interpreting labs, testing, and imaging.  - Independently obtaining a review of systems and performing a physical exam  - Reviewing prior hospitalization and where necessary, outpatient records.  - Reviewing consultant recommendations/communicating with consultants  - Counselling and educating patient and family regarding interpretation of aforementioned items and plan of care.    Time-based billing (NON-critical care). Total minutes spent: 52

## 2025-04-16 NOTE — PROGRESS NOTE ADULT - NUTRITIONAL ASSESSMENT
This patient has been assessed with a concern for Malnutrition and has been determined to have a diagnosis/diagnoses of Moderate protein-calorie malnutrition.    This patient is being managed with:   Diet Clear Liquid-  Entered: Apr 9 2025 11:06AM  
This patient has been assessed with a concern for Malnutrition and has been determined to have a diagnosis/diagnoses of Moderate protein-calorie malnutrition.    This patient is being managed with:   Diet NPO after Midnight-     NPO Start Date: 13-Apr-2025   NPO Start Time: 23:59  Entered: Apr 13 2025 10:34AM    Diet Clear Liquid-  Entered: Apr 9 2025 11:06AM  
This patient has been assessed with a concern for Malnutrition and has been determined to have a diagnosis/diagnoses of Moderate protein-calorie malnutrition.    This patient is being managed with:   Diet Clear Liquid-  Entered: Apr 9 2025 11:06AM  
This patient has been assessed with a concern for Malnutrition and has been determined to have a diagnosis/diagnoses of Moderate protein-calorie malnutrition.    This patient is being managed with:   Diet NPO after Midnight-     NPO Start Date: 13-Apr-2025   NPO Start Time: 23:59  Entered: Apr 13 2025 10:34AM    Diet Clear Liquid-  Entered: Apr 9 2025 11:06AM  
This patient has been assessed with a concern for Malnutrition and has been determined to have a diagnosis/diagnoses of Moderate protein-calorie malnutrition.    This patient is being managed with:   Diet NPO after Midnight-     NPO Start Date: 13-Apr-2025   NPO Start Time: 23:59  Entered: Apr 13 2025 10:34AM    Diet Clear Liquid-  Entered: Apr 9 2025 11:06AM  
This patient has been assessed with a concern for Malnutrition and has been determined to have a diagnosis/diagnoses of Moderate protein-calorie malnutrition.    This patient is being managed with:   Diet Regular-  Entered: Apr 15 2025 10:11AM  
This patient has been assessed with a concern for Malnutrition and has been determined to have a diagnosis/diagnoses of Moderate protein-calorie malnutrition.    This patient is being managed with:   Diet Regular-  Entered: Apr 15 2025 10:11AM

## 2025-04-16 NOTE — PROGRESS NOTE ADULT - SUBJECTIVE AND OBJECTIVE BOX
Lewis Kaur MD  [facility] Division of Hospital Medicine    SUBJECTIVE / OVERNIGHT EVENTS:  - no events overnight, no n/v/abd pain/cough/chest pain. otherwise in good spirits, diarrhea resolving now. sitting up on the chair having breakfast.     MEDICATIONS  (STANDING):  atorvastatin 20 milliGRAM(s) Oral at bedtime  bisacodyl 10 milliGRAM(s) Oral once  ferrous    sulfate 325 milliGRAM(s) Oral daily  LORazepam     Tablet 0.5 milliGRAM(s) Oral at bedtime  pantoprazole    Tablet 40 milliGRAM(s) Oral before breakfast    MEDICATIONS  (PRN):  acetaminophen     Tablet .. 650 milliGRAM(s) Oral every 6 hours PRN Mild Pain (1 - 3), Moderate Pain (4 - 6)  cyclobenzaprine 5 milliGRAM(s) Oral three times a day PRN Muscle Spasm  ondansetron Injectable 4 milliGRAM(s) IV Push every 8 hours PRN Nausea and/or Vomiting      I&O's Summary    15 Apr 2025 07:01  -  16 Apr 2025 07:00  --------------------------------------------------------  IN: 360 mL / OUT: 4 mL / NET: 356 mL        PHYSICAL EXAM:  Vital Signs Last 24 Hrs  T(C): 36.7 (16 Apr 2025 10:29), Max: 36.9 (15 Apr 2025 23:11)  T(F): 98.1 (16 Apr 2025 10:29), Max: 98.4 (15 Apr 2025 23:11)  HR: 97 (16 Apr 2025 10:29) (87 - 102)  BP: 105/72 (16 Apr 2025 10:29) (104/71 - 118/76)  BP(mean): --  RR: 18 (16 Apr 2025 10:29) (18 - 18)  SpO2: 97% (16 Apr 2025 10:29) (95% - 98%)      CONSTITUTIONAL: NAD, well-developed, well-groomed  RESPIRATORY: Normal respiratory effort; lungs are clear to auscultation bilaterally  CARDIOVASCULAR: Regular rate and rhythm, normal S1 and S2, no murmur/rub/gallop; No lower extremity edema  ABDOMEN: Nontender to palpation, normoactive bowel sounds, no rebound/guarding;   MUSCULOSKELETAL: no clubbing or cyanosis of digits; no joint swelling or tenderness to palpation  NEUROLOGY: CN 2-12 are intact and symmetric; no gross sensory deficits   SKIN: No rashes; no palpable lesions    LABS:                        10.3   8.38  )-----------( 230      ( 16 Apr 2025 07:00 )             30.3     04-16    141  |  108  |  16  ----------------------------<  129[H]  3.4[L]   |  27  |  0.57    Ca    9.0      16 Apr 2025 07:00  Phos  2.9     04-16  Mg     2.0     04-16    TPro  6.1  /  Alb  3.1[L]  /  TBili  0.6  /  DBili  x   /  AST  9[L]  /  ALT  17  /  AlkPhos  56  04-16          Urinalysis Basic - ( 16 Apr 2025 07:00 )    Color: x / Appearance: x / SG: x / pH: x  Gluc: 129 mg/dL / Ketone: x  / Bili: x / Urobili: x   Blood: x / Protein: x / Nitrite: x   Leuk Esterase: x / RBC: x / WBC x   Sq Epi: x / Non Sq Epi: x / Bacteria: x

## 2025-04-16 NOTE — PROGRESS NOTE ADULT - REASON FOR ADMISSION
lower GI bleed

## 2025-04-17 ENCOUNTER — TRANSCRIPTION ENCOUNTER (OUTPATIENT)
Age: 75
End: 2025-04-17

## 2025-04-17 VITALS
SYSTOLIC BLOOD PRESSURE: 116 MMHG | OXYGEN SATURATION: 99 % | RESPIRATION RATE: 18 BRPM | TEMPERATURE: 99 F | DIASTOLIC BLOOD PRESSURE: 79 MMHG | HEART RATE: 99 BPM

## 2025-04-17 PROCEDURE — 99239 HOSP IP/OBS DSCHRG MGMT >30: CPT

## 2025-04-17 RX ORDER — FERROUS SULFATE 137(45) MG
1 TABLET, EXTENDED RELEASE ORAL
Qty: 0 | Refills: 0 | DISCHARGE
Start: 2025-04-17

## 2025-04-17 RX ORDER — ATORVASTATIN CALCIUM 80 MG/1
1 TABLET, FILM COATED ORAL
Qty: 0 | Refills: 0 | DISCHARGE
Start: 2025-04-17

## 2025-04-17 RX ORDER — FERROUS SULFATE 137(45) MG
1 TABLET, EXTENDED RELEASE ORAL
Refills: 0 | DISCHARGE

## 2025-04-17 RX ORDER — CYCLOBENZAPRINE HYDROCHLORIDE 15 MG/1
1 CAPSULE, EXTENDED RELEASE ORAL
Qty: 0 | Refills: 0 | DISCHARGE
Start: 2025-04-17

## 2025-04-17 RX ORDER — BISACODYL 5 MG
2 TABLET, DELAYED RELEASE (ENTERIC COATED) ORAL
Qty: 0 | Refills: 0 | DISCHARGE
Start: 2025-04-17

## 2025-04-17 RX ADMIN — Medication 325 MILLIGRAM(S): at 12:52

## 2025-04-17 RX ADMIN — Medication 40 MILLIGRAM(S): at 05:33

## 2025-04-17 NOTE — DISCHARGE NOTE PROVIDER - NSDCMRMEDTOKEN_GEN_ALL_CORE_FT
atorvastatin 20 mg oral tablet: 1 tab(s) orally once a day (at bedtime)  bisacodyl 5 mg oral delayed release tablet: 2 tab(s) orally once  cholecalciferol 25 mcg (1000 intl units) oral tablet: 1 tab(s) orally once a day  cyclobenzaprine 5 mg oral tablet: 1 tab(s) orally 3 times a day As needed Muscle Spasm  ferrous sulfate 325 mg (65 mg elemental iron) oral tablet: 1 tab(s) orally once a day  LORazepam 0.5 mg oral tablet: 1 tab(s) orally once a day (at bedtime)  pantoprazole 40 mg oral delayed release tablet: 1 tab(s) orally once a day (before a meal)

## 2025-04-17 NOTE — DISCHARGE NOTE PROVIDER - HOSPITAL COURSE
74 years old female with h/o HLD, GERD, anxiety, h/o diverticulitis s/p colon resection in 2009, h/o GI bleed, IBS, osteoporosis present to ED with complain of bloody stool. Patient was admitted her ( 4/7-4/8) for similar complain. CTA with no active GI bleed, with stable Hb and discharged later that day, and now presents with large episode of bloody stool associated with near syncope, Admitted for persistent GIB requiring transfusion.     #Acute blood loss anemia 2/2 lower GI bleed  -bloody bowel movement  -Received  3u prbc this admission  -status post colonoscopy w/no active bleeding noted  -negative CT angio  -Bleeding scan done on 4/14 - no active bleed - IR and surgery signed off  -tolerating regular diet now  -PT consult given severe debility, pt feels unsteady - recs tila - pt amenable now as she cannot even walk up the stairs -   -had loose stools which subsided yesterday afternoon    #HLD- continue with atorvastatin     #GERD   oral PPI    #Anxiety disorder  on lorazepam 0.5mg hs    DVT ppx: SCD  Diet: regular  Dispo: -PT recs tila - medically cleared for tila - will dc today

## 2025-04-17 NOTE — DISCHARGE NOTE PROVIDER - NSDCCPTREATMENT_GEN_ALL_CORE_FT
PRINCIPAL PROCEDURE  Procedure: CT angio abdomen  Findings and Treatment: FINDINGS:  LOWER CHEST: Within normal limits.  LIVER: Cysts and other lesions too small to characterize.  BILE DUCTS: Normal caliber.  GALLBLADDER: Within normal limits.  SPLEEN: Within normal limits.  PANCREAS: Within normal limits.  ADRENALS: Within normal limits.  KIDNEYS/URETERS: Cysts and other lesions too small to characterize.  BLADDER: Within normal limits.  REPRODUCTIVE ORGANS: Within normal limits.  BOWEL: No bowel obstruction. Rectosigmoid anastamosis.  No GI bleed.  PERITONEUM/RETROPERITONEUM: Within normal limits.  VESSELS:  Within normal limits.  LYMPH NODES: Within normal limits.  ABDOMINAL WALL: Within normal limits.  BONES: Within normal limits.  IMPRESSION: No GI bleed.        SECONDARY PROCEDURE  Procedure: NM radionuclide GI bleeding study  Findings and Treatment:   IMPRESSION:  No evidence of active GI bleeding site.  Mild activity is noted in the bladder. This may represent physiologic   radiopharmaceutical clearance although bleeding may have a similar   appearance. Clinical correlation and with urinalysis as indicated.  Mild activity is noted in the perineum. This may represent urinary   contamination or bleeding from the anorectal or vaginal area. Clinical   correlation as indicated.

## 2025-04-17 NOTE — DISCHARGE NOTE PROVIDER - ATTENDING DISCHARGE PHYSICAL EXAMINATION:
CONSTITUTIONAL: NAD, well-developed, well-groomed  RESPIRATORY: lungs are clear to auscultation bilaterally  CARDIOVASCULAR: Regular rate and rhythm, normal S1 and S2, no murmur/rub/gallop; No lower extremity edema  ABDOMEN: Nontender to palpation, normoactive bowel sounds, no rebound/guarding;

## 2025-04-17 NOTE — DISCHARGE NOTE PROVIDER - NSDCCPCAREPLAN_GEN_ALL_CORE_FT
PRINCIPAL DISCHARGE DIAGNOSIS  Diagnosis: Rectal bleeding  Assessment and Plan of Treatment: You came in with rectal bleeding. We stabalized your hemoglobin by giving you blood and your condition improved. You also received a colonoscopy which did not show any active bleeding, and your CT scan and tagged RBC scan did not show any active bleeding at this time. In the meantime your hemoglobin/blood counts stabilized and your blood pressure was normal. Your last hemoglobin wsa 10.3 on 4/16. Please continue to improve at rehab. You should see a gastroenterologist in their office. If there is any worsening bleeding, low blood pressure, dizziness, lightheadedness, please return to the emergency room.

## 2025-04-17 NOTE — DISCHARGE NOTE NURSING/CASE MANAGEMENT/SOCIAL WORK - PATIENT PORTAL LINK FT
You can access the FollowMyHealth Patient Portal offered by [facility] by registering at the following website: [email or web address]. By joining [facility]’s FollowMyHealth portal, you will also be able to view your health information using other applications (apps) compatible with our system.

## 2025-04-17 NOTE — DISCHARGE NOTE NURSING/CASE MANAGEMENT/SOCIAL WORK - FINANCIAL ASSISTANCE
[facility] provides services at a reduced cost to those who are determined to be eligible through [facility]’s financial assistance program. Information regarding [facility]’s financial assistance program can be found by going to [email or web address] or by calling [phone].

## 2025-04-17 NOTE — DISCHARGE NOTE NURSING/CASE MANAGEMENT/SOCIAL WORK - NSDCPEFALRISK_GEN_ALL_CORE
For information on Fall & Injury Prevention, visit: [email or web address] OR  [email or web address] OR  https://www.cdc.gov/steadi/patient.html

## 2025-04-17 NOTE — DISCHARGE NOTE PROVIDER - DETAILS OF MALNUTRITION DIAGNOSIS/DIAGNOSES
This patient has been assessed with a concern for Malnutrition and was treated during this hospitalization for the following Nutrition diagnosis/diagnoses:     -  04/11/2025: Moderate protein-calorie malnutrition

## 2025-04-17 NOTE — DISCHARGE NOTE PROVIDER - CARE PROVIDER_API CALL
Timothy Nunez  Internal Medicine  [address]  [place] [address]  Phone: [phone]  Fax: [phone]  Follow Up Time: 1 week

## 2025-04-18 ENCOUNTER — NON-APPOINTMENT (OUTPATIENT)
Age: 75
End: 2025-04-18

## 2025-04-29 DIAGNOSIS — Z91.048 OTHER NONMEDICINAL SUBSTANCE ALLERGY STATUS: ICD-10-CM

## 2025-04-29 DIAGNOSIS — D62 ACUTE POSTHEMORRHAGIC ANEMIA: ICD-10-CM

## 2025-04-29 DIAGNOSIS — K21.9 GASTRO-ESOPHAGEAL REFLUX DISEASE WITHOUT ESOPHAGITIS: ICD-10-CM

## 2025-04-29 DIAGNOSIS — Z91.018 ALLERGY TO OTHER FOODS: ICD-10-CM

## 2025-04-29 DIAGNOSIS — Z88.1 ALLERGY STATUS TO OTHER ANTIBIOTIC AGENTS: ICD-10-CM

## 2025-04-29 DIAGNOSIS — K92.2 GASTROINTESTINAL HEMORRHAGE, UNSPECIFIED: ICD-10-CM

## 2025-04-29 DIAGNOSIS — E44.0 MODERATE PROTEIN-CALORIE MALNUTRITION: ICD-10-CM

## 2025-04-29 DIAGNOSIS — M81.0 AGE-RELATED OSTEOPOROSIS WITHOUT CURRENT PATHOLOGICAL FRACTURE: ICD-10-CM

## 2025-04-29 DIAGNOSIS — E78.5 HYPERLIPIDEMIA, UNSPECIFIED: ICD-10-CM

## 2025-04-29 DIAGNOSIS — E83.42 HYPOMAGNESEMIA: ICD-10-CM

## 2025-04-29 DIAGNOSIS — E87.6 HYPOKALEMIA: ICD-10-CM

## 2025-04-29 DIAGNOSIS — K62.5 HEMORRHAGE OF ANUS AND RECTUM: ICD-10-CM

## 2025-04-29 DIAGNOSIS — K57.90 DIVERTICULOSIS OF INTESTINE, PART UNSPECIFIED, WITHOUT PERFORATION OR ABSCESS WITHOUT BLEEDING: ICD-10-CM

## 2025-04-29 DIAGNOSIS — Z88.2 ALLERGY STATUS TO SULFONAMIDES: ICD-10-CM

## 2025-04-29 DIAGNOSIS — F41.9 ANXIETY DISORDER, UNSPECIFIED: ICD-10-CM

## 2025-05-20 ENCOUNTER — NON-APPOINTMENT (OUTPATIENT)
Age: 75
End: 2025-05-20

## (undated) DEVICE — TUBING HYBRID CO2

## (undated) DEVICE — ADAPTER ENDO CHNL SINGLE USE

## (undated) DEVICE — Device

## (undated) DEVICE — GLV 7.5 PROTEXIS (WHITE)

## (undated) DEVICE — TUBING ERBE CO2 OLYMPUS CONNECTOR

## (undated) DEVICE — KIT ENDO PROCEDURE CUST W/VLV